# Patient Record
Sex: FEMALE | Race: WHITE | Employment: FULL TIME | URBAN - METROPOLITAN AREA
[De-identification: names, ages, dates, MRNs, and addresses within clinical notes are randomized per-mention and may not be internally consistent; named-entity substitution may affect disease eponyms.]

---

## 2017-03-07 NOTE — PROGRESS NOTES
[facility] Internal Medicine Office Note  Chief Complaint:   Patient presents with:  Establish Care: last pap 1/2017- lily pt declined flu shot  Anxiety      HPI:   This is a 42year old female new patient coming in for anxiety  She says it ha Physical Exam   Vitals reviewed. Constitutional: She appears well-developed. No distress. HENT:   Head: Normocephalic and atraumatic. Right Ear: Tympanic membrane is not erythematous. Left Ear: Tympanic membrane is not erythematous.    Mouth/Throa Patient/Caregiver Education: There are no barriers to learning. Medical education done. Outcome: Patient verbalizes understanding. Patient is notified to call with any questions, complications, allergies, or worsening or changing symptoms.   Patient is to c

## 2017-03-07 NOTE — PATIENT INSTRUCTIONS
Thank you for choosing Michelle Dunlap MD at [place] [address]  To Do: Julius Otoole  1. Start alprazolam as needed  2. Follow up with Gayle Horvath   3.  Follow up in 6 weeks     • Please signup for MY CHART, which is electronic access to your record if call Central Scheduling at [phone]  Please allow our office 5 business days to contact you regarding any testing results.     Refill policies:   Allow 3 business days for refills; controlled substances may take longer and must be picked up from the off

## 2017-09-27 NOTE — TELEPHONE ENCOUNTER
Requesting Alprazolam 0.25 mg   LOV: 3/7/17  Anxiety - discussed starting alprazolam again (do not take with alcohol or drive with it) and if she feels like she is needing it more than a few times per week to consider an everyday medication such as lexapro

## 2017-10-02 RX ORDER — ALPRAZOLAM 0.25 MG/1
TABLET ORAL
Qty: 30 TABLET | Refills: 0
Start: 2017-10-02

## 2017-11-07 ENCOUNTER — LAB ENCOUNTER (OUTPATIENT)
Dept: LAB | Age: 37
End: 2017-11-07
Attending: INTERNAL MEDICINE
Payer: COMMERCIAL

## 2017-11-07 ENCOUNTER — OFFICE VISIT (OUTPATIENT)
Dept: FAMILY MEDICINE CLINIC | Facility: CLINIC | Age: 37
End: 2017-11-07

## 2017-11-07 VITALS
RESPIRATION RATE: 14 BRPM | BODY MASS INDEX: 23.39 KG/M2 | SYSTOLIC BLOOD PRESSURE: 110 MMHG | DIASTOLIC BLOOD PRESSURE: 70 MMHG | HEART RATE: 72 BPM | WEIGHT: 149 LBS | TEMPERATURE: 97 F | HEIGHT: 67 IN

## 2017-11-07 DIAGNOSIS — E55.9 HYPOVITAMINOSIS D: ICD-10-CM

## 2017-11-07 DIAGNOSIS — Z00.00 LABORATORY EXAM ORDERED AS PART OF ROUTINE GENERAL MEDICAL EXAMINATION: ICD-10-CM

## 2017-11-07 DIAGNOSIS — F41.9 ANXIETY: ICD-10-CM

## 2017-11-07 DIAGNOSIS — Z00.00 WELLNESS EXAMINATION: Primary | ICD-10-CM

## 2017-11-07 DIAGNOSIS — Z80.0 FAMILY HISTORY OF COLON CANCER: ICD-10-CM

## 2017-11-07 PROCEDURE — 82306 VITAMIN D 25 HYDROXY: CPT | Performed by: INTERNAL MEDICINE

## 2017-11-07 PROCEDURE — 99395 PREV VISIT EST AGE 18-39: CPT | Performed by: INTERNAL MEDICINE

## 2017-11-07 PROCEDURE — 80050 GENERAL HEALTH PANEL: CPT | Performed by: INTERNAL MEDICINE

## 2017-11-07 PROCEDURE — 90715 TDAP VACCINE 7 YRS/> IM: CPT | Performed by: INTERNAL MEDICINE

## 2017-11-07 PROCEDURE — 90471 IMMUNIZATION ADMIN: CPT | Performed by: INTERNAL MEDICINE

## 2017-11-07 PROCEDURE — 36415 COLL VENOUS BLD VENIPUNCTURE: CPT | Performed by: INTERNAL MEDICINE

## 2017-11-07 PROCEDURE — 80061 LIPID PANEL: CPT | Performed by: INTERNAL MEDICINE

## 2017-11-07 RX ORDER — ALPRAZOLAM 0.25 MG/1
0.25 TABLET ORAL 2 TIMES DAILY PRN
Qty: 30 TABLET | Refills: 4 | Status: SHIPPED | OUTPATIENT
Start: 2017-11-07 | End: 2018-10-30

## 2017-11-07 NOTE — PROGRESS NOTES
[facility] Internal Medicine Office Note  Chief Complaint:   Patient presents with: Anxiety: states she's doing good with med. Medication Follow-Up: refill Alprazolam  Imm/Inj: declines the flu vaccine today.       HPI:   This is a 42year old f kg/m² as calculated from the following:    Height as of this encounter: 67\". Weight as of this encounter: 149 lb. Vital signs reviewed. Appears stated age, well groomed. Physical Exam   Vitals reviewed. Constitutional: She appears well-developed. REFLEX TO FREE T4; Future    Other orders  -     FLULAVAL INFLUENZA VACCINE QUAD PRESERVATIVE FREE 0.5 ML  -     TETANUS, DIPHTHERIA TOXOIDS AND ACELLULAR PERTUSIS VACCINE (TDAP), >7 YEARS, IM USE    Orders Placed This Encounter      CBC W Differential W P

## 2017-11-07 NOTE — PROGRESS NOTES
Received via fax Pap & HPV results dated 12/06/2016 from Dr. Marcus Chi @ [address] [place] [address]. Health maintenance updated & report placed in Dr. Silvestre Anders in box for review.

## 2017-11-07 NOTE — PATIENT INSTRUCTIONS
Thank you for choosing Aleksey Page MD at [place] [address]  To Do: Tarry Severs  1. Blood work today  2. Consider screening colonoscopy at age 34 because of family history     For your records:  Tdap tetanus shot today 11/7/17  Effective 6/19/17 until discussed today.  All therapies have potential risk of harm or side effects or medication interactions.  It is your duty and for your safety to discuss with the pharmacist and our office with questions, and to notify us and stop treatment if problems arise

## 2018-01-20 ENCOUNTER — APPOINTMENT (OUTPATIENT)
Dept: CT IMAGING | Age: 38
End: 2018-01-20
Attending: EMERGENCY MEDICINE
Payer: COMMERCIAL

## 2018-01-20 ENCOUNTER — HOSPITAL ENCOUNTER (EMERGENCY)
Age: 38
Discharge: HOME | End: 2018-01-20
Attending: EMERGENCY MEDICINE
Payer: COMMERCIAL

## 2018-01-20 VITALS
HEART RATE: 86 BPM | OXYGEN SATURATION: 98 % | WEIGHT: 140 LBS | BODY MASS INDEX: 21.22 KG/M2 | RESPIRATION RATE: 20 BRPM | TEMPERATURE: 98 F | DIASTOLIC BLOOD PRESSURE: 77 MMHG | SYSTOLIC BLOOD PRESSURE: 132 MMHG | HEIGHT: 68 IN

## 2018-01-20 DIAGNOSIS — N20.0 KIDNEY STONE: Primary | ICD-10-CM

## 2018-01-20 DIAGNOSIS — N39.0 URINARY TRACT INFECTION WITHOUT HEMATURIA, SITE UNSPECIFIED: ICD-10-CM

## 2018-01-20 LAB
COLOR UR AUTO: YELLOW
GLUCOSE UR STRIP.AUTO-MCNC: NEGATIVE MG/DL
NITRITE UR QL STRIP.AUTO: NEGATIVE
PH UR STRIP.AUTO: 5.5 [PH] (ref 4.5–8)
POCT LOT NUMBER: NORMAL
POCT URINE PREGNANCY: NEGATIVE
SP GR UR STRIP.AUTO: >=1.03 (ref 1–1.03)
UROBILINOGEN UR STRIP.AUTO-MCNC: 0.2 MG/DL

## 2018-01-20 PROCEDURE — 99283 EMERGENCY DEPT VISIT LOW MDM: CPT

## 2018-01-20 PROCEDURE — 87086 URINE CULTURE/COLONY COUNT: CPT | Performed by: EMERGENCY MEDICINE

## 2018-01-20 PROCEDURE — 81001 URINALYSIS AUTO W/SCOPE: CPT | Performed by: EMERGENCY MEDICINE

## 2018-01-20 PROCEDURE — 81025 URINE PREGNANCY TEST: CPT

## 2018-01-20 PROCEDURE — 87086 URINE CULTURE/COLONY COUNT: CPT

## 2018-01-20 PROCEDURE — 81001 URINALYSIS AUTO W/SCOPE: CPT

## 2018-01-20 RX ORDER — CIPROFLOXACIN 500 MG/1
500 TABLET, FILM COATED ORAL 2 TIMES DAILY
Qty: 6 TABLET | Refills: 0 | Status: SHIPPED | OUTPATIENT
Start: 2018-01-20 | End: 2018-01-23

## 2018-01-20 NOTE — ED PROVIDER NOTES
Patient Seen in: [facility] [place]    History   Patient presents with:  Abdomen/Flank Pain (GI/)    Stated Complaint: right flank pain started this morning, hx of kidney stones, denies nausea, vomi*    HPI    13-year-old female Sclerae anicteric. Conjunctivae show no pallor. Oropharynx clear, mucous membranes moist   Neck: supple, no rigidity   Lungs: good air exchange and clear   Heart: regular rate rhythm and no murmur   Abdomen: Soft and nontender. No abdominal masses.   No within reasonable clinical confidence and prior to discharge, that an emergency medical condition was not or was no longer present. There was no indication for further evaluation, treatment or admission on an emergency basis.   Comprehensive verbal and wri

## 2018-01-20 NOTE — ED INITIAL ASSESSMENT (HPI)
Pt c/o right flank pain since this morning. Denies nausea, vomiting, or fever. States hx of kidney stones.

## 2018-07-15 DIAGNOSIS — F41.9 ANXIETY: ICD-10-CM

## 2018-07-16 NOTE — TELEPHONE ENCOUNTER
Refill requested: Alprazolam .25     Failed protocol      Last refill: 11/7/17 #30 4R    Relevant Labs: NA    Last OV / RTC advised: 11/7/17 Dr Sheffield Ormond RTC?   Appt Scheduled:

## 2018-07-17 RX ORDER — ALPRAZOLAM 0.25 MG/1
0.25 TABLET ORAL 2 TIMES DAILY PRN
Qty: 30 TABLET | Refills: 0 | OUTPATIENT
Start: 2018-07-17

## 2018-10-30 ENCOUNTER — OFFICE VISIT (OUTPATIENT)
Dept: INTERNAL MEDICINE CLINIC | Facility: CLINIC | Age: 38
End: 2018-10-30
Payer: COMMERCIAL

## 2018-10-30 ENCOUNTER — LAB ENCOUNTER (OUTPATIENT)
Dept: LAB | Age: 38
End: 2018-10-30
Attending: INTERNAL MEDICINE
Payer: COMMERCIAL

## 2018-10-30 VITALS
TEMPERATURE: 99 F | RESPIRATION RATE: 14 BRPM | DIASTOLIC BLOOD PRESSURE: 72 MMHG | BODY MASS INDEX: 23.7 KG/M2 | SYSTOLIC BLOOD PRESSURE: 118 MMHG | HEART RATE: 78 BPM | HEIGHT: 67 IN | WEIGHT: 151 LBS

## 2018-10-30 DIAGNOSIS — E55.9 HYPOVITAMINOSIS D: ICD-10-CM

## 2018-10-30 DIAGNOSIS — Z00.00 LABORATORY EXAM ORDERED AS PART OF ROUTINE GENERAL MEDICAL EXAMINATION: ICD-10-CM

## 2018-10-30 DIAGNOSIS — Z00.00 WELLNESS EXAMINATION: Primary | ICD-10-CM

## 2018-10-30 DIAGNOSIS — N92.6 MISSED PERIOD: ICD-10-CM

## 2018-10-30 DIAGNOSIS — F41.9 ANXIETY: ICD-10-CM

## 2018-10-30 PROCEDURE — 81025 URINE PREGNANCY TEST: CPT | Performed by: INTERNAL MEDICINE

## 2018-10-30 PROCEDURE — 80050 GENERAL HEALTH PANEL: CPT | Performed by: INTERNAL MEDICINE

## 2018-10-30 PROCEDURE — 36415 COLL VENOUS BLD VENIPUNCTURE: CPT | Performed by: INTERNAL MEDICINE

## 2018-10-30 PROCEDURE — 82306 VITAMIN D 25 HYDROXY: CPT | Performed by: INTERNAL MEDICINE

## 2018-10-30 PROCEDURE — 99396 PREV VISIT EST AGE 40-64: CPT | Performed by: INTERNAL MEDICINE

## 2018-10-30 RX ORDER — ALPRAZOLAM 0.25 MG/1
0.25 TABLET ORAL 2 TIMES DAILY PRN
Qty: 30 TABLET | Refills: 4 | Status: SHIPPED | OUTPATIENT
Start: 2018-10-30 | End: 2019-07-30

## 2018-10-30 NOTE — PROGRESS NOTES
[facility] Internal Medicine Office Note  Chief Complaint:   Patient presents with: Follow - Up: f/u and refill on meds. Asked about pregn. test      HPI:   This is a 34year old female coming in for f/u for anxiety  HPI  Anxiety doing ok.  Some 98.6 °F (37 °C) (Oral)   Resp 14   Ht 67\"   Wt 151 lb   LMP 09/17/2018 (Exact Date)   Breastfeeding? No   BMI 23.65 kg/m²  Estimated body mass index is 23.65 kg/m² as calculated from the following:    Height as of this encounter: 67\".     Weight as of thi medical examination  -     CBC WITH DIFFERENTIAL WITH PLATELET; Future  -     COMP METABOLIC PANEL (14); Future  -     TSH W REFLEX TO FREE T4; Future    Other orders  -     Cancel: [facility] 2D+3D SCREENING BILAT (CPT=77067/39960);  Future        Orders [place]

## 2018-11-01 RX ORDER — ERGOCALCIFEROL 1.25 MG/1
50000 CAPSULE ORAL WEEKLY
Qty: 12 CAPSULE | Refills: 0 | Status: SHIPPED | OUTPATIENT
Start: 2018-11-01 | End: 2019-01-30

## 2019-03-28 ENCOUNTER — TELEPHONE (OUTPATIENT)
Dept: INTERNAL MEDICINE CLINIC | Facility: CLINIC | Age: 39
End: 2019-03-28

## 2019-03-28 NOTE — TELEPHONE ENCOUNTER
Results received from [address] [place] [address] regarding patient's mammogram .    Per report, additional views needed.     LVM for patient to make sure appt scheduled for additional views

## 2019-07-02 DIAGNOSIS — F41.9 ANXIETY: ICD-10-CM

## 2019-07-02 RX ORDER — ALPRAZOLAM 0.25 MG/1
TABLET ORAL
Qty: 30 TABLET | Refills: 0 | OUTPATIENT
Start: 2019-07-02

## 2019-07-02 NOTE — TELEPHONE ENCOUNTER
Alprazolam 0.25 MG Oral tab  Take 1 tablet (0.25mg total) by mouth  2 times daily as needed for anxiety      Last OV relevant to medication: 10/30/2018    Last refill date: 10/30/2018     #/refills: #30 w/ 4 refills    When pt was asked to return for OV: N

## 2019-07-30 NOTE — PROGRESS NOTES
[facility] Internal Medicine Office Note  Chief Complaint:   Patient presents with:  Medication Follow-Up      HPI:   This is a 37year old female coming in for follow up   HPI  She takes 1-2 alprazolam doses per week   Anxiety has been doing ok calculated from the following:    Height as of this encounter: 67\". Weight as of this encounter: 151 lb. Vital signs reviewed. Appears stated age, well groomed. Physical Exam    Constitutional: She appears well-developed. No distress.    HENT:   Head Patient is to call with any side effects or complications from the treatments as a result of today.      Apple Issa MD

## 2020-02-08 DIAGNOSIS — F41.9 ANXIETY: ICD-10-CM

## 2020-02-08 NOTE — TELEPHONE ENCOUNTER
Sent [employer] message - asking pt to schedule appt for refill. Failed protocol     Last refill:  7/30/2019 Alprazolam .25 mg #30 5R    LOV:   7/30/2019 Dr Mary Ann Villagran RTC ? Anxiety - continue alprazolam as needed. Symptoms are stable. -     ALPRAZolam 0.25 MG Oral Tab; Take 1 tablet (0.25 mg total) by mouth 2 (two) times daily as needed for Anxiety.       No FOV scheduled

## 2020-02-12 RX ORDER — ALPRAZOLAM 0.25 MG/1
TABLET ORAL
Qty: 30 TABLET | Refills: 0 | OUTPATIENT
Start: 2020-02-12

## 2020-03-06 NOTE — PROGRESS NOTES
Adela Jones is a 41year old female. HPI:   Patient presents for medication refill for alprazolam.     Anxiety -- Patient reports her anxiety \"the same\" but worse lately because she's working a lot of overtime( 10 hours/week).  She reports taking alp social    Drug use: No      Family History   Problem Relation Age of Onset   • Other (Other) Father         diverticulitis/gout   • Breast Cancer Maternal Grandmother 61   • Colon Cancer Other 48        REVIEW OF SYSTEMS:   GENERAL: no recent illness, evgenyg issues and agrees to the plan.   The patient is asked to return in 6 months for annual physical.

## 2020-03-06 NOTE — PROGRESS NOTES
[facility] Internal Medicine Office Note  Chief Complaint:   Patient presents with:  Medication Follow-Up      HPI:   This is a 41year old female coming in for  HPI    Anxiety has been worse recently; takes about twice a week, about 6 tabs/week Psychiatric/Behavioral: Negative.          EXAM:   /76 (BP Location: Left arm, Patient Position: Sitting, Cuff Size: adult)   Pulse 70   Temp 98.2 °F (36.8 °C) (Oral)   Ht 67\"   Wt 159 lb 4 oz (72.2 kg)   BMI 24.94 kg/m²  Estimated body mass index Platelet [E]      Comp Metabolic Panel (14) [E]      Lipid Panel [E]      TSH W Reflex To Free T4 [E]      Vitamin D, 25-Hydroxy [E]      Meds & Refills for this Visit:  Requested Prescriptions     Signed Prescriptions Disp Refills   • ALPRAZolam 0.25 MG O

## 2020-12-08 NOTE — PROGRESS NOTES
This is a telemedicine visit with live, interactive video and audio. Patient understands and accepts financial responsibility for any deductible, co-insurance and/or co-pays associated with this service.     SUBJECTIVE  She takes alprazolam for anxiety No acute distress. No increased work of breathing. Alert and oriented. ASSESSMENT & PLAN  Diagnoses and all orders for this visit:    Anxiety -cont alprazolam sparingly as needed.  If needing more than 4 to 5 doses/week, consider short term follow up

## 2021-06-15 DIAGNOSIS — F41.9 ANXIETY: ICD-10-CM

## 2021-06-17 RX ORDER — ALPRAZOLAM 0.25 MG/1
0.25 TABLET ORAL 2 TIMES DAILY PRN
Qty: 30 TABLET | Refills: 0 | Status: SHIPPED | OUTPATIENT
Start: 2021-06-17 | End: 2021-08-14

## 2021-08-14 ENCOUNTER — OFFICE VISIT (OUTPATIENT)
Dept: INTERNAL MEDICINE CLINIC | Facility: CLINIC | Age: 41
End: 2021-08-14
Payer: COMMERCIAL

## 2021-08-14 VITALS
TEMPERATURE: 98 F | RESPIRATION RATE: 16 BRPM | HEART RATE: 63 BPM | BODY MASS INDEX: 23.13 KG/M2 | OXYGEN SATURATION: 99 % | HEIGHT: 67 IN | DIASTOLIC BLOOD PRESSURE: 70 MMHG | SYSTOLIC BLOOD PRESSURE: 136 MMHG | WEIGHT: 147.38 LBS

## 2021-08-14 DIAGNOSIS — Z00.00 ENCOUNTER FOR ROUTINE ADULT MEDICAL EXAMINATION: Primary | ICD-10-CM

## 2021-08-14 DIAGNOSIS — Z00.00 LABORATORY EXAM ORDERED AS PART OF ROUTINE GENERAL MEDICAL EXAMINATION: ICD-10-CM

## 2021-08-14 DIAGNOSIS — E55.9 HYPOVITAMINOSIS D: ICD-10-CM

## 2021-08-14 DIAGNOSIS — F41.9 ANXIETY: ICD-10-CM

## 2021-08-14 PROCEDURE — 3078F DIAST BP <80 MM HG: CPT | Performed by: INTERNAL MEDICINE

## 2021-08-14 PROCEDURE — 3008F BODY MASS INDEX DOCD: CPT | Performed by: INTERNAL MEDICINE

## 2021-08-14 PROCEDURE — 3075F SYST BP GE 130 - 139MM HG: CPT | Performed by: INTERNAL MEDICINE

## 2021-08-14 PROCEDURE — 99396 PREV VISIT EST AGE 40-64: CPT | Performed by: INTERNAL MEDICINE

## 2021-08-14 RX ORDER — ALPRAZOLAM 0.25 MG/1
0.25 TABLET ORAL 2 TIMES DAILY PRN
Qty: 30 TABLET | Refills: 5 | Status: SHIPPED | OUTPATIENT
Start: 2021-08-14

## 2021-08-14 NOTE — PROGRESS NOTES
[facility] Internal Medicine Office Note  Chief Complaint:   Patient presents with:  Medication Follow-Up      HPI:   This is a 35year old female coming in for physical   HPI    Anxiety - she is exercising 4-5 days/week  She takes alprazolam abo EXAM:   /70   Pulse 63   Temp 98 °F (36.7 °C) (Oral)   Resp 16   Ht 5' 7\" (1.702 m)   Wt 147 lb 6.4 oz (66.9 kg)   LMP 07/31/2021   SpO2 99%   BMI 23.09 kg/m²  Estimated body mass index is 23.09 kg/m² as calculated from the following:    Emma recheck   -     VITAMIN D, 25-HYDROXY; Future    Laboratory exam ordered as part of routine general medical examination  -     CBC WITH DIFFERENTIAL WITH PLATELET; Future  -     COMP METABOLIC PANEL (14); Future  -     LIPID PANEL;  Future  -     TSH W REFL

## 2021-08-25 ENCOUNTER — PATIENT MESSAGE (OUTPATIENT)
Dept: INTERNAL MEDICINE CLINIC | Facility: CLINIC | Age: 41
End: 2021-08-25

## 2021-08-25 NOTE — TELEPHONE ENCOUNTER
From: Vince Corey MD  To: Fritz Fuentes  Sent: 8/25/2021 8:43 AM CDT  Subject: mammogram results    Hi Bozena,     I received a copy of your mammogram results ordered by Dr. Sandie Barrientos.  A diagnostic mammogram is recommended for the left breast and I just wante

## 2021-09-18 ENCOUNTER — LABORATORY ENCOUNTER (OUTPATIENT)
Dept: LAB | Age: 41
End: 2021-09-18
Attending: INTERNAL MEDICINE
Payer: COMMERCIAL

## 2021-09-18 DIAGNOSIS — E55.9 HYPOVITAMINOSIS D: ICD-10-CM

## 2021-09-18 DIAGNOSIS — Z00.00 LABORATORY EXAM ORDERED AS PART OF ROUTINE GENERAL MEDICAL EXAMINATION: ICD-10-CM

## 2021-09-18 LAB
ALBUMIN SERPL-MCNC: 3.4 G/DL (ref 3.4–5)
ALBUMIN/GLOB SERPL: 1 {RATIO} (ref 1–2)
ALP LIVER SERPL-CCNC: 54 U/L
ALT SERPL-CCNC: 33 U/L
ANION GAP SERPL CALC-SCNC: 5 MMOL/L (ref 0–18)
AST SERPL-CCNC: 17 U/L (ref 15–37)
BASOPHILS # BLD AUTO: 0.02 X10(3) UL (ref 0–0.2)
BASOPHILS NFR BLD AUTO: 0.4 %
BILIRUB SERPL-MCNC: 0.3 MG/DL (ref 0.1–2)
BUN BLD-MCNC: 11 MG/DL (ref 7–18)
CALCIUM BLD-MCNC: 9.6 MG/DL (ref 8.5–10.1)
CHLORIDE SERPL-SCNC: 112 MMOL/L (ref 98–112)
CHOLEST SERPL-MCNC: 193 MG/DL (ref ?–200)
CO2 SERPL-SCNC: 22 MMOL/L (ref 21–32)
CREAT BLD-MCNC: 0.65 MG/DL
EOSINOPHIL # BLD AUTO: 0.15 X10(3) UL (ref 0–0.7)
EOSINOPHIL NFR BLD AUTO: 3.1 %
ERYTHROCYTE [DISTWIDTH] IN BLOOD BY AUTOMATED COUNT: 12.7 %
GLOBULIN PLAS-MCNC: 3.5 G/DL (ref 2.8–4.4)
GLUCOSE BLD-MCNC: 87 MG/DL (ref 70–99)
HCT VFR BLD AUTO: 38.4 %
HDLC SERPL-MCNC: 61 MG/DL (ref 40–59)
HGB BLD-MCNC: 12.8 G/DL
IMM GRANULOCYTES # BLD AUTO: 0.01 X10(3) UL (ref 0–1)
IMM GRANULOCYTES NFR BLD: 0.2 %
LDLC SERPL CALC-MCNC: 113 MG/DL (ref ?–100)
LYMPHOCYTES # BLD AUTO: 1.61 X10(3) UL (ref 1–4)
LYMPHOCYTES NFR BLD AUTO: 33.5 %
MCH RBC QN AUTO: 32.2 PG (ref 26–34)
MCHC RBC AUTO-ENTMCNC: 33.3 G/DL (ref 31–37)
MCV RBC AUTO: 96.5 FL
MONOCYTES # BLD AUTO: 0.39 X10(3) UL (ref 0.1–1)
MONOCYTES NFR BLD AUTO: 8.1 %
NEUTROPHILS # BLD AUTO: 2.63 X10 (3) UL (ref 1.5–7.7)
NEUTROPHILS # BLD AUTO: 2.63 X10(3) UL (ref 1.5–7.7)
NEUTROPHILS NFR BLD AUTO: 54.7 %
NONHDLC SERPL-MCNC: 132 MG/DL (ref ?–130)
OSMOLALITY SERPL CALC.SUM OF ELEC: 287 MOSM/KG (ref 275–295)
PATIENT FASTING Y/N/NP: YES
PATIENT FASTING Y/N/NP: YES
PLATELET # BLD AUTO: 280 10(3)UL (ref 150–450)
POTASSIUM SERPL-SCNC: 4.1 MMOL/L (ref 3.5–5.1)
PROT SERPL-MCNC: 6.9 G/DL (ref 6.4–8.2)
RBC # BLD AUTO: 3.98 X10(6)UL
SODIUM SERPL-SCNC: 139 MMOL/L (ref 136–145)
TRIGL SERPL-MCNC: 106 MG/DL (ref 30–149)
TSI SER-ACNC: 3.26 MIU/ML (ref 0.36–3.74)
VIT D+METAB SERPL-MCNC: 36.2 NG/ML (ref 30–100)
VLDLC SERPL CALC-MCNC: 18 MG/DL (ref 0–30)
WBC # BLD AUTO: 4.8 X10(3) UL (ref 4–11)

## 2021-09-18 PROCEDURE — 80050 GENERAL HEALTH PANEL: CPT | Performed by: INTERNAL MEDICINE

## 2021-09-18 PROCEDURE — 80061 LIPID PANEL: CPT | Performed by: INTERNAL MEDICINE

## 2021-09-18 PROCEDURE — 82306 VITAMIN D 25 HYDROXY: CPT | Performed by: INTERNAL MEDICINE

## 2021-10-15 ENCOUNTER — TELEPHONE (OUTPATIENT)
Dept: INTERNAL MEDICINE CLINIC | Facility: CLINIC | Age: 41
End: 2021-10-15

## 2021-10-15 NOTE — TELEPHONE ENCOUNTER
Pt c/o sharp stabbing pain/headache on the right side of her head since Monday. Patient also reports nausea. Headaches are constant and worsening when pressure is applied to back of neck at base of skull. Headaches have affected patients ability to sleep. Denies vomiting, vision changes, SOB, CP, arrhythmias. Unable to assess HR and BP at this time. Patient has taken ibuprofen which provides relief, however, patient has to take it around the clock to maintain relief. Next appointment not available until next Tuesday, appt offered to patient who declined. Patient stated that she would proceed to  for evaluation.

## 2021-10-15 NOTE — TELEPHONE ENCOUNTER
Patient has been experiencing a headache and nausea since Monday. She would like to speak with a nurse to discuss her symptoms. Please advise.

## 2021-11-09 ENCOUNTER — TELEPHONE (OUTPATIENT)
Dept: INTERNAL MEDICINE CLINIC | Facility: CLINIC | Age: 41
End: 2021-11-09

## 2021-11-09 NOTE — TELEPHONE ENCOUNTER
Pt contacted and c/o left sided abdominal cramping which stated on 11/4. Symptoms worse Saturday and Sunday. Rates pain today at a 3 on scale 1-10. BM normal with no blood. Reports abdominal area bloated and feels hard.  No c/o fever, chills, diarrhea, c

## 2021-11-09 NOTE — TELEPHONE ENCOUNTER
Pt complains of left sided ABD pain/cramping onset last Thursday 11/4  Worse over the weekend, Saturday & Sunday  Normal BM   Pain/cramping not as bad today but still there  abd feels hard to touch, bloating

## 2021-11-10 NOTE — TELEPHONE ENCOUNTER
Please reach out to patient to see if she went to urgent care/ER yesterday for eval or if she is still having symptoms, I can add her onto my schedule at 11am today    Agree with below.

## 2021-11-11 NOTE — TELEPHONE ENCOUNTER
Spoke with patient-- never proceed to UC. States that sx have resolved. Some mild bloating still present. Abd pain has resolved. Patient reports that she is passing flatus and having bowel movements. Denies abd pain, fevers, N/V/D, chills/sweats.

## 2021-11-17 ENCOUNTER — PATIENT MESSAGE (OUTPATIENT)
Dept: INTERNAL MEDICINE CLINIC | Facility: CLINIC | Age: 41
End: 2021-11-17

## 2021-11-17 ENCOUNTER — TELEPHONE (OUTPATIENT)
Dept: INTERNAL MEDICINE CLINIC | Facility: CLINIC | Age: 41
End: 2021-11-17

## 2021-11-17 DIAGNOSIS — E83.52 HYPERCALCEMIA: Primary | ICD-10-CM

## 2021-11-17 NOTE — TELEPHONE ENCOUNTER
From: Kaleigh Armstrong  To: Elfego Suarez MD  Sent: 11/17/2021 11:16 AM CST  Subject: Test results    Hello,    I have attached my test results from blood work done on 11/15/21.      Thank you

## 2021-11-17 NOTE — TELEPHONE ENCOUNTER
Pt contacted and stated she was seen at [facility] on 11/15 for abd pain. Pt states CT abdomen was ordered by IC and faxed to [facility]. She is waiting for them to obtain insurance approval and appointment. Pt continues with abdominal bloating and dull pain. Rates pain at 3 on scale 1-10. No c/o nausea, vomiting, fever, chills, diarrhea, constipation decreased appetite or bloody stools. Pt also stated her calcium level was elevated to 11. Follow-up appointment offered with Dr Mundo Rincon. Pt declined to schedule at this time and will attempt to send [employer] message with IC notes attached. Pt stated she is  unable to fax the notes and lives to far to drop off.

## 2021-11-17 NOTE — TELEPHONE ENCOUNTER
She will need repeat calcium level and parathyroid blood work drawn in 1 month (order placed). I recommend scheduling follow up appointment after CT is performed to discuss results and next steps.

## 2021-11-17 NOTE — TELEPHONE ENCOUNTER
Pt stated she went to the  for abdominal pain this Monday 11/15/21, pt did labs at [facility] and got the results last night, pt was advised to call her pcp regarding her Calcium results, pt was told they are elevated up to an 11 pt is requesting to talk to a nurse to further discuss.

## 2021-11-20 ENCOUNTER — LABORATORY ENCOUNTER (OUTPATIENT)
Dept: LAB | Age: 41
End: 2021-11-20
Attending: INTERNAL MEDICINE
Payer: COMMERCIAL

## 2021-11-20 DIAGNOSIS — Z00.00 LABORATORY EXAM ORDERED AS PART OF ROUTINE GENERAL MEDICAL EXAMINATION: ICD-10-CM

## 2021-11-20 DIAGNOSIS — E83.52 HYPERCALCEMIA: ICD-10-CM

## 2021-11-20 DIAGNOSIS — E55.9 HYPOVITAMINOSIS D: ICD-10-CM

## 2021-11-20 PROCEDURE — 83970 ASSAY OF PARATHORMONE: CPT | Performed by: INTERNAL MEDICINE

## 2021-11-20 PROCEDURE — 80061 LIPID PANEL: CPT | Performed by: INTERNAL MEDICINE

## 2021-11-20 PROCEDURE — 80050 GENERAL HEALTH PANEL: CPT | Performed by: INTERNAL MEDICINE

## 2021-11-20 PROCEDURE — 82306 VITAMIN D 25 HYDROXY: CPT | Performed by: INTERNAL MEDICINE

## 2021-11-23 DIAGNOSIS — E83.52 HYPERCALCEMIA: Primary | ICD-10-CM

## 2021-12-03 ENCOUNTER — LAB ENCOUNTER (OUTPATIENT)
Dept: LAB | Age: 41
End: 2021-12-03
Attending: INTERNAL MEDICINE
Payer: COMMERCIAL

## 2021-12-03 DIAGNOSIS — E83.52 HYPERCALCEMIA: ICD-10-CM

## 2021-12-03 PROCEDURE — 82340 ASSAY OF CALCIUM IN URINE: CPT | Performed by: INTERNAL MEDICINE

## 2021-12-10 ENCOUNTER — TELEMEDICINE (OUTPATIENT)
Dept: INTERNAL MEDICINE CLINIC | Facility: CLINIC | Age: 41
End: 2021-12-10

## 2021-12-10 DIAGNOSIS — E83.52 HYPERCALCEMIA: Primary | ICD-10-CM

## 2021-12-10 DIAGNOSIS — F41.9 ANXIETY: ICD-10-CM

## 2021-12-10 PROCEDURE — 99213 OFFICE O/P EST LOW 20 MIN: CPT | Performed by: INTERNAL MEDICINE

## 2021-12-10 NOTE — PROGRESS NOTES
This is a telemedicine visit with live, interactive video and audio. Patient understands and accepts financial responsibility for any deductible, co-insurance and/or co-pays associated with this service.     SUBJECTIVE  Hypercalcemia:  She was having ab oriented. ASSESSMENT & PLAN  Diagnoses and all orders for this visit:    Hypercalcemia - reviewed results with patient. At urgent care: 11.0 (ref range 8.7-10.2) -> 10.7 (ref range 8.5-10.1) on repeat   24 hour urine calcium was 219 from 12/3/21.   iPTH

## 2022-01-24 NOTE — ED AVS SNAPSHOT
Fritz Gina   MRN: [id number]    Department:  THE [facility] Emergency Department in [place]   Date of Visit:  1/20/2018           Disclosure     Insurance plans vary and the physician(s) referred by the ER may not be covered by your plan.  Please contact y tell this physician (or your personal doctor if your instructions are to return to your personal doctor) about any new or lasting problems. The primary care or specialist physician will see patients referred from the [facility] Emergency Department.  Dwain Lind Initiate Treatment: sulfacetamide sodium-sulfur 10 %-5 % (w/w) topical cleanser: Apply to face BID\\ndoxycycline hyclate 100 mg capsule: Take 1 capsule PO BID with food for 1 month\\nmetronidazole 0.75 % topical cream: apply pea size amount to affected areas on chin twice a day Render In Strict Bullet Format?: No Detail Level: Zone

## 2022-02-09 RX ORDER — ALPRAZOLAM 0.25 MG/1
0.25 TABLET ORAL 2 TIMES DAILY PRN
Qty: 30 TABLET | Refills: 5 | OUTPATIENT
Start: 2022-02-09

## 2022-02-09 RX ORDER — ALPRAZOLAM 0.25 MG/1
TABLET ORAL
Qty: 30 TABLET | Refills: 0 | Status: SHIPPED | OUTPATIENT
Start: 2022-02-09 | End: 2022-03-16

## 2022-02-09 NOTE — TELEPHONE ENCOUNTER
No Protocol     Requesting: alprazolam 0.25mg     LOV:12/10/21   Anxiety:  She takes alprazolam intermittently, about 2 days a week for anxiety or insomnia  She notes palpitations occasionally.  She took alprazolam at time of palpitations and this helped  Anxiety - cont alprazolam sparingly as needed  RTC: none noted   Filled: 8/14/21 #30 5 refills   Recent Labs: 11/20/21     Upcoming OV: 5/4/22

## 2022-03-12 ENCOUNTER — LAB ENCOUNTER (OUTPATIENT)
Dept: LAB | Age: 42
End: 2022-03-12
Attending: SURGERY
Payer: COMMERCIAL

## 2022-03-12 DIAGNOSIS — E21.3 HYPERPARATHYROIDISM (HCC): ICD-10-CM

## 2022-03-12 DIAGNOSIS — Z01.818 PRE-OP TESTING: ICD-10-CM

## 2022-03-12 DIAGNOSIS — Z20.822 ENCOUNTER FOR PREOPERATIVE SCREENING LABORATORY TESTING FOR COVID-19 VIRUS: ICD-10-CM

## 2022-03-12 DIAGNOSIS — Z01.812 ENCOUNTER FOR PREOPERATIVE SCREENING LABORATORY TESTING FOR COVID-19 VIRUS: ICD-10-CM

## 2022-03-15 ENCOUNTER — ANESTHESIA EVENT (OUTPATIENT)
Dept: SURGERY | Facility: HOSPITAL | Age: 42
End: 2022-03-15
Payer: COMMERCIAL

## 2022-03-15 ENCOUNTER — HOSPITAL ENCOUNTER (OUTPATIENT)
Facility: HOSPITAL | Age: 42
Setting detail: HOSPITAL OUTPATIENT SURGERY
Discharge: HOME | End: 2022-03-15
Attending: SURGERY | Admitting: SURGERY
Payer: COMMERCIAL

## 2022-03-15 ENCOUNTER — ANESTHESIA (OUTPATIENT)
Dept: SURGERY | Facility: HOSPITAL | Age: 42
End: 2022-03-15
Payer: COMMERCIAL

## 2022-03-15 VITALS
BODY MASS INDEX: 24.27 KG/M2 | HEIGHT: 68 IN | DIASTOLIC BLOOD PRESSURE: 86 MMHG | OXYGEN SATURATION: 96 % | WEIGHT: 160.13 LBS | TEMPERATURE: 98 F | RESPIRATION RATE: 18 BRPM | SYSTOLIC BLOOD PRESSURE: 130 MMHG | HEART RATE: 86 BPM

## 2022-03-15 DIAGNOSIS — E21.3 HYPERPARATHYROIDISM (HCC): ICD-10-CM

## 2022-03-15 DIAGNOSIS — Z01.818 PRE-OP TESTING: Primary | ICD-10-CM

## 2022-03-15 DIAGNOSIS — Z20.822 ENCOUNTER FOR PREOPERATIVE SCREENING LABORATORY TESTING FOR COVID-19 VIRUS: ICD-10-CM

## 2022-03-15 DIAGNOSIS — Z01.812 ENCOUNTER FOR PREOPERATIVE SCREENING LABORATORY TESTING FOR COVID-19 VIRUS: ICD-10-CM

## 2022-03-15 LAB
B-HCG UR QL: NEGATIVE
PTH-INTACT SERPL-MCNC: 102.8 PG/ML
PTH-INTACT SERPL-MCNC: 1078.9 PG/ML
PTH-INTACT SERPL-MCNC: 136.4 PG/ML
PTH-INTACT SERPL-MCNC: 163.1 PG/ML
PTH-INTACT SERPL-MCNC: 38.8 PG/ML

## 2022-03-15 PROCEDURE — 0GBL0ZZ EXCISION OF RIGHT SUPERIOR PARATHYROID GLAND, OPEN APPROACH: ICD-10-PCS | Performed by: SURGERY

## 2022-03-15 PROCEDURE — 0GBP0ZZ EXCISION OF LEFT INFERIOR PARATHYROID GLAND, OPEN APPROACH: ICD-10-PCS | Performed by: SURGERY

## 2022-03-15 PROCEDURE — 88331 PATH CONSLTJ SURG 1 BLK 1SPC: CPT | Performed by: SURGERY

## 2022-03-15 PROCEDURE — 83970 ASSAY OF PARATHORMONE: CPT | Performed by: SURGERY

## 2022-03-15 PROCEDURE — 88305 TISSUE EXAM BY PATHOLOGIST: CPT | Performed by: SURGERY

## 2022-03-15 PROCEDURE — 81025 URINE PREGNANCY TEST: CPT

## 2022-03-15 RX ORDER — GLYCOPYRROLATE 0.2 MG/ML
INJECTION, SOLUTION INTRAMUSCULAR; INTRAVENOUS AS NEEDED
Status: DISCONTINUED | OUTPATIENT
Start: 2022-03-15 | End: 2022-03-15 | Stop reason: SURG

## 2022-03-15 RX ORDER — MEPERIDINE HYDROCHLORIDE 25 MG/ML
12.5 INJECTION INTRAMUSCULAR; INTRAVENOUS; SUBCUTANEOUS AS NEEDED
Status: DISCONTINUED | OUTPATIENT
Start: 2022-03-15 | End: 2022-03-15

## 2022-03-15 RX ORDER — NEOSTIGMINE METHYLSULFATE 1 MG/ML
INJECTION INTRAVENOUS AS NEEDED
Status: DISCONTINUED | OUTPATIENT
Start: 2022-03-15 | End: 2022-03-15 | Stop reason: SURG

## 2022-03-15 RX ORDER — ONDANSETRON 2 MG/ML
4 INJECTION INTRAMUSCULAR; INTRAVENOUS AS NEEDED
Status: DISCONTINUED | OUTPATIENT
Start: 2022-03-15 | End: 2022-03-15

## 2022-03-15 RX ORDER — IBUPROFEN 800 MG/1
800 TABLET ORAL EVERY 8 HOURS PRN
Qty: 20 TABLET | Refills: 1 | Status: SHIPPED | OUTPATIENT
Start: 2022-03-15 | End: 2022-04-04

## 2022-03-15 RX ORDER — BUPIVACAINE HYDROCHLORIDE 5 MG/ML
INJECTION, SOLUTION EPIDURAL; INTRACAUDAL AS NEEDED
Status: DISCONTINUED | OUTPATIENT
Start: 2022-03-15 | End: 2022-03-15 | Stop reason: HOSPADM

## 2022-03-15 RX ORDER — HYDROCODONE BITARTRATE AND ACETAMINOPHEN 5; 325 MG/1; MG/1
1 TABLET ORAL EVERY 4 HOURS PRN
Qty: 10 TABLET | Refills: 0 | Status: SHIPPED | OUTPATIENT
Start: 2022-03-15 | End: 2022-04-04

## 2022-03-15 RX ORDER — ACETAMINOPHEN 500 MG
1000 TABLET ORAL ONCE
COMMUNITY

## 2022-03-15 RX ORDER — METOCLOPRAMIDE HYDROCHLORIDE 5 MG/ML
10 INJECTION INTRAMUSCULAR; INTRAVENOUS AS NEEDED
Status: DISCONTINUED | OUTPATIENT
Start: 2022-03-15 | End: 2022-03-15

## 2022-03-15 RX ORDER — MIDAZOLAM HYDROCHLORIDE 1 MG/ML
1 INJECTION INTRAMUSCULAR; INTRAVENOUS EVERY 5 MIN PRN
Status: DISCONTINUED | OUTPATIENT
Start: 2022-03-15 | End: 2022-03-15

## 2022-03-15 RX ORDER — ROCURONIUM BROMIDE 10 MG/ML
INJECTION, SOLUTION INTRAVENOUS AS NEEDED
Status: DISCONTINUED | OUTPATIENT
Start: 2022-03-15 | End: 2022-03-15 | Stop reason: SURG

## 2022-03-15 RX ORDER — HYDROCODONE BITARTRATE AND ACETAMINOPHEN 5; 325 MG/1; MG/1
1 TABLET ORAL AS NEEDED
Status: COMPLETED | OUTPATIENT
Start: 2022-03-15 | End: 2022-03-15

## 2022-03-15 RX ORDER — DEXAMETHASONE SODIUM PHOSPHATE 4 MG/ML
VIAL (ML) INJECTION AS NEEDED
Status: DISCONTINUED | OUTPATIENT
Start: 2022-03-15 | End: 2022-03-15 | Stop reason: SURG

## 2022-03-15 RX ORDER — NALOXONE HYDROCHLORIDE 0.4 MG/ML
80 INJECTION, SOLUTION INTRAMUSCULAR; INTRAVENOUS; SUBCUTANEOUS AS NEEDED
Status: DISCONTINUED | OUTPATIENT
Start: 2022-03-15 | End: 2022-03-15

## 2022-03-15 RX ORDER — HYDROMORPHONE HYDROCHLORIDE 1 MG/ML
0.4 INJECTION, SOLUTION INTRAMUSCULAR; INTRAVENOUS; SUBCUTANEOUS EVERY 5 MIN PRN
Status: DISCONTINUED | OUTPATIENT
Start: 2022-03-15 | End: 2022-03-15

## 2022-03-15 RX ORDER — HYDROMORPHONE HYDROCHLORIDE 1 MG/ML
INJECTION, SOLUTION INTRAMUSCULAR; INTRAVENOUS; SUBCUTANEOUS
Status: COMPLETED
Start: 2022-03-15 | End: 2022-03-15

## 2022-03-15 RX ORDER — ONDANSETRON 2 MG/ML
INJECTION INTRAMUSCULAR; INTRAVENOUS AS NEEDED
Status: DISCONTINUED | OUTPATIENT
Start: 2022-03-15 | End: 2022-03-15 | Stop reason: SURG

## 2022-03-15 RX ORDER — DIPHENHYDRAMINE HYDROCHLORIDE 50 MG/ML
12.5 INJECTION INTRAMUSCULAR; INTRAVENOUS AS NEEDED
Status: DISCONTINUED | OUTPATIENT
Start: 2022-03-15 | End: 2022-03-15

## 2022-03-15 RX ORDER — SODIUM CHLORIDE, SODIUM LACTATE, POTASSIUM CHLORIDE, CALCIUM CHLORIDE 600; 310; 30; 20 MG/100ML; MG/100ML; MG/100ML; MG/100ML
INJECTION, SOLUTION INTRAVENOUS CONTINUOUS
Status: DISCONTINUED | OUTPATIENT
Start: 2022-03-15 | End: 2022-03-15

## 2022-03-15 RX ORDER — HYDROCODONE BITARTRATE AND ACETAMINOPHEN 5; 325 MG/1; MG/1
2 TABLET ORAL AS NEEDED
Status: COMPLETED | OUTPATIENT
Start: 2022-03-15 | End: 2022-03-15

## 2022-03-15 RX ORDER — DEXAMETHASONE SODIUM PHOSPHATE 4 MG/ML
4 VIAL (ML) INJECTION AS NEEDED
Status: DISCONTINUED | OUTPATIENT
Start: 2022-03-15 | End: 2022-03-15

## 2022-03-15 RX ORDER — METOCLOPRAMIDE HYDROCHLORIDE 5 MG/ML
INJECTION INTRAMUSCULAR; INTRAVENOUS AS NEEDED
Status: DISCONTINUED | OUTPATIENT
Start: 2022-03-15 | End: 2022-03-15 | Stop reason: SURG

## 2022-03-15 RX ADMIN — GLYCOPYRROLATE 0.2 MG: 0.2 INJECTION, SOLUTION INTRAMUSCULAR; INTRAVENOUS at 09:00:00

## 2022-03-15 RX ADMIN — ONDANSETRON 4 MG: 2 INJECTION INTRAMUSCULAR; INTRAVENOUS at 08:20:00

## 2022-03-15 RX ADMIN — GLYCOPYRROLATE 0.2 MG: 0.2 INJECTION, SOLUTION INTRAMUSCULAR; INTRAVENOUS at 08:38:00

## 2022-03-15 RX ADMIN — ROCURONIUM BROMIDE 15 MG: 10 INJECTION, SOLUTION INTRAVENOUS at 08:20:00

## 2022-03-15 RX ADMIN — NEOSTIGMINE METHYLSULFATE 1.5 MG: 1 INJECTION INTRAVENOUS at 08:38:00

## 2022-03-15 RX ADMIN — DEXAMETHASONE SODIUM PHOSPHATE 4 MG: 4 MG/ML VIAL (ML) INJECTION at 08:20:00

## 2022-03-15 RX ADMIN — METOCLOPRAMIDE HYDROCHLORIDE 10 MG: 5 INJECTION INTRAMUSCULAR; INTRAVENOUS at 08:20:00

## 2022-03-15 NOTE — BRIEF OP NOTE
Pre-Operative Diagnosis: Hyperparathyroidism ([facility] [address] [E21.3]     Post-Operative Diagnosis: Hyperparathyroidism ([facility] [address] [E21.3]      Procedure Performed:   PARATHYROIDECTOMY WITH INTRAOPERTIVE ULTRASOUND INTACT PARATHORMONE ASSAY, NERVE MONITORING    Surgeon(s) and Role:     Sharon Moses MD - Primary    Assistant(s):  Surgical Assistant.: KARI Mata     Surgical Findings: right superior - cellular     Left inferior - cellular; 76 % drop in intact PTH  Component      Latest Ref Rng & Units 3/15/2022 3/15/2022 3/15/2022 3/15/2022          10:12 AM  9:17 AM  9:04 AM  8:47 AM   PTH INTACT      pg/mL 38.8 136.4 163.1 1,078.9     Component      Latest Ref Rng & Units 3/15/2022           8:29 AM   PTH INTACT      pg/mL 102.8        Specimen: parathyroid gland     Estimated Blood Loss: Blood Output: 5 mL (3/15/2022 10:58 AM)    Dictation Number:  Rosa Leos MD  3/15/2022  11:07 AM

## 2022-03-15 NOTE — OPERATIVE REPORT
[facility]    PATIENT'S NAME: Mary Coe   ATTENDING PHYSICIAN: Annamarie Mathews M.D. OPERATING PHYSICIAN: Annamarie Mathews M.D. PATIENT ACCOUNT#:   [de-identified]    LOCATION:  [place] [address]  MEDICAL RECORD #:   [id number]       YOB: 1980  ADMISSION DATE:       03/15/2022      OPERATION DATE:  03/15/2022    OPERATIVE REPORT      PREOPERATIVE DIAGNOSIS:  Hyperparathyroidism. POSTOPERATIVE DIAGNOSIS:  Hyperparathyroidism. PROCEDURE:  Parathyroidectomy with intraoperative ultrasound, intact PTH assay, intraoperative frozen section, and bilateral neuromonitoring. ASSISTANT:  KARI Antoine     ANESTHESIA:  General.    ESTIMATED BLOOD LOSS:  5 mL. SPECIMEN:  Parathyroid gland. DISPOSITION:  To PACU. COMPLICATIONS:  None. INDICATIONS:  Patient is a 70-year-old female who presented to the office for evaluation of hyperparathyroidism. The patient stated her routine calcium level was elevated, and subsequently PTH was also checked and was elevated. She had on-and-off kidney stones for the last 15 years. She drinks plenty of water and has frequent urination. She does have some tiredness and fatigue, as well as some short-term memory and concentration issues, but she denied of any aches. Her highest calcium level was 11, with a 24-hour urine calcium that was normal; highest intact PTH level was 89; and normal vitamin D level of 37.5. Ultrasound was done and able to visualize the right posterior parathyroid gland measuring about 1.3 cm. No DEXA scan was done given her age was 38. So based on this findings, discussed with the patient undergoing a parathyroidectomy. The risks and benefits of surgery were discussed, and patient agreed for the procedure and signed the informed consent. FINDINGS:  Right superior and left inferior parathyroid glands cellular on frozen section. PTH levels 102.8, 1078.9, 163.1, 136.4, and 38.8, and 76% drop in the intact PTH level. OPERATIVE TECHNIQUE:  Procedure patient was brought to the operating room, was placed in supine position on the operating table. Lower extremity SCD boots were placed. After IV sedation and the neuromonitoring and endotracheal tube placement, both arms tucked at the side and roll was placed under shoulder blade, neck was slightly hyperextended. Ultrasound was done again and was able to visualize the left parathyroid gland. Then, after the area was prepped and draped into a sterile field, lower Kocher neck incision was made with a 15-blade knife. Electric Bovie cautery was used to separate the subcutaneous tissue, and the superior and inferior flaps raised. Deep cervical fascia was identified, midline incision was made, and then the left strap muscle was carefully dissected from the thyroid capsule. Then as the gland was medially rotated, at the inferior aspect of the thyroid gland the parathyroid gland was attached. It appears to be mildly enlarged. Then, the superior gland was also identified, which was normal.  Then, due to the smaller size than anticipated, the attention was turned to the right side to also look, and then the right inferior gland was normal but the right superior gland was larger than the left inferior, so that gland was removed. Three blood samples obtained pre-incision, pre-excision, and 10 minutes post excision of the gland. Even though the level dropped over 90%, pre-excision level was markedly high due to the stimulation of the gland. Then, 20 minutes post excision of the gland, the intact PTH level was obtained, and the blood level did not significantly drop in comparison. So, at this time, the left inferior gland was also dissected and removed, and it was sent down to Pathology. Then, the post excision of the inferior gland finally met the criteria. Both glands were cellular on frozen section. Then, the area was inspected. There was no evidence of any bleeding.   Good hemostasis noted. A piece of Surgicel laid, and a 3-0 Vicryl was used to close the deep cervical dermal layer. Local anesthetic was injected into the surrounding tissue, 4-0 Monocryl for the skin placing a subcuticular stitch. The incision was then covered with Steri-Strips, 4 x 4, and tape. The patient tolerated the procedure well, was extubated in the operating room, and transferred to PACU in stable condition. At the end of the case, the needle, instrument, and sponge counts were all correct. The surgical assistant was necessary for the procedure in positioning the patient, retracting the tissue, passing instruments, and exposing the wound to do the surgery, and at the end to close the skin and transfer the patient out of the operating room.      Dictated By Arsalan Larsen M.D.  d: 03/15/2022 13:10:08  t: 03/15/2022 18:23:44  Job [id number]  YZ/

## 2022-03-15 NOTE — ANESTHESIA PROCEDURE NOTES
Airway  Date/Time: 3/15/2022 8:28 AM  Urgency: elective      General Information and Staff    Patient location during procedure: OR  Anesthesiologist: Lauryn Ace MD  Performed: anesthesiologist     Indications and Patient Condition  Indications for airway management: anesthesia  Sedation level: deep  Preoxygenated: yes  Patient position: sniffing  Mask difficulty assessment: 1 - vent by mask    Final Airway Details  Final airway type: endotracheal airway      Successful airway: ETT  Cuffed: yes   Successful intubation technique: direct laryngoscopy  Endotracheal tube insertion site: oral    Cormack-Lehane Classification: grade IIB - view of arytenoids or posterior of glottis only  Placement verified by: chest auscultation and capnometry   Measured from: lips  ETT to lips (cm): 21  Number of attempts at approach: 1

## 2022-03-16 RX ORDER — ALPRAZOLAM 0.25 MG/1
TABLET ORAL
Qty: 30 TABLET | Refills: 0 | Status: SHIPPED | OUTPATIENT
Start: 2022-03-16

## 2022-03-16 NOTE — TELEPHONE ENCOUNTER
LOV: 12/10/2021 with Dr. Francisca Morley  RTC: no follow-up on file  Last Relevant Labs: 11/20/2021  Filled: 2/9/2022    #30 with 0 refills    Future Appointments   Date Time Provider Sergo [place]enegro   4/4/2022  8:45 AM Shashank Hung MD [facility]  [facility]   5/4/2022  9:45 AM Arabella Garcia MD [facility] [place]

## 2022-04-04 ENCOUNTER — LAB ENCOUNTER (OUTPATIENT)
Dept: LAB | Age: 42
End: 2022-04-04
Attending: SURGERY
Payer: COMMERCIAL

## 2022-04-04 DIAGNOSIS — Z09 EXAMINATION FOLLOWING SURGERY: ICD-10-CM

## 2022-04-04 LAB — CALCIUM BLD-MCNC: 11 MG/DL (ref 8.5–10.1)

## 2022-04-04 PROCEDURE — 36415 COLL VENOUS BLD VENIPUNCTURE: CPT

## 2022-04-04 PROCEDURE — 82310 ASSAY OF CALCIUM: CPT

## 2022-04-05 NOTE — PROGRESS NOTES
Lucie, have her repeat calcium and intact PTH in two week  Stop all calcium supplements if she is taking currently

## 2022-04-18 NOTE — TELEPHONE ENCOUNTER
LOV: 12/10/2021 with Dr. Guadalupe Hernandez  RTC: no follow-up on file  Last Relevant Labs: November/December 2021  Filled: 3/16/2022   #30 with 0 refills    Future Appointments   Date Time Provider Sergo Montenegro   4/19/2022  7:30 AM [facility]   5/4/2022  9:45 AM Kodak Euceda MD [facility] 8 [facility] [place]

## 2022-04-20 RX ORDER — ALPRAZOLAM 0.25 MG/1
0.25 TABLET ORAL 2 TIMES DAILY
Qty: 30 TABLET | Refills: 0 | Status: SHIPPED | OUTPATIENT
Start: 2022-04-20

## 2022-05-04 NOTE — PATIENT INSTRUCTIONS
Call today to schedule appointment with endocrinology    If you decide to start escitalopram (Lexapro is brand name), take 1/2 tablet once a day for the first 4 or 5 doses (cut in half with a pill cutter which you can buy at the pharmacy or use knife/cutting board). Then increase to full tablet. Consider follow up with our counselor, Lazaro Ribera.

## 2022-05-18 ENCOUNTER — OFFICE VISIT (OUTPATIENT)
Dept: ENDOCRINOLOGY CLINIC | Facility: CLINIC | Age: 42
End: 2022-05-18
Payer: COMMERCIAL

## 2022-05-18 VITALS
SYSTOLIC BLOOD PRESSURE: 152 MMHG | HEART RATE: 83 BPM | DIASTOLIC BLOOD PRESSURE: 98 MMHG | BODY MASS INDEX: 24 KG/M2 | WEIGHT: 157 LBS

## 2022-05-18 DIAGNOSIS — E21.3 HYPERPARATHYROIDISM (HCC): Primary | ICD-10-CM

## 2022-05-18 DIAGNOSIS — E83.52 HYPERCALCEMIA: ICD-10-CM

## 2022-05-18 PROCEDURE — 3080F DIAST BP >= 90 MM HG: CPT | Performed by: STUDENT IN AN ORGANIZED HEALTH CARE EDUCATION/TRAINING PROGRAM

## 2022-05-18 PROCEDURE — 3077F SYST BP >= 140 MM HG: CPT | Performed by: STUDENT IN AN ORGANIZED HEALTH CARE EDUCATION/TRAINING PROGRAM

## 2022-05-18 PROCEDURE — 99204 OFFICE O/P NEW MOD 45 MIN: CPT | Performed by: STUDENT IN AN ORGANIZED HEALTH CARE EDUCATION/TRAINING PROGRAM

## 2022-05-19 ENCOUNTER — LABORATORY ENCOUNTER (OUTPATIENT)
Dept: LAB | Age: 42
End: 2022-05-19
Attending: STUDENT IN AN ORGANIZED HEALTH CARE EDUCATION/TRAINING PROGRAM
Payer: COMMERCIAL

## 2022-05-19 DIAGNOSIS — E21.3 HYPERPARATHYROIDISM (HCC): Primary | ICD-10-CM

## 2022-05-19 DIAGNOSIS — E21.3 HYPERPARATHYROIDISM (HCC): ICD-10-CM

## 2022-05-19 LAB
CALCIUM BLD-MCNC: 10.5 MG/DL (ref 8.5–10.1)
PHOSPHATE SERPL-MCNC: 3 MG/DL (ref 2.5–4.9)
PTH-INTACT SERPL-MCNC: 91.4 PG/ML (ref 18.5–88)
VIT D+METAB SERPL-MCNC: 41.3 NG/ML (ref 30–100)

## 2022-05-19 PROCEDURE — 83970 ASSAY OF PARATHORMONE: CPT | Performed by: STUDENT IN AN ORGANIZED HEALTH CARE EDUCATION/TRAINING PROGRAM

## 2022-05-19 PROCEDURE — 84100 ASSAY OF PHOSPHORUS: CPT | Performed by: STUDENT IN AN ORGANIZED HEALTH CARE EDUCATION/TRAINING PROGRAM

## 2022-05-19 PROCEDURE — 82330 ASSAY OF CALCIUM: CPT | Performed by: STUDENT IN AN ORGANIZED HEALTH CARE EDUCATION/TRAINING PROGRAM

## 2022-05-19 PROCEDURE — 82310 ASSAY OF CALCIUM: CPT | Performed by: STUDENT IN AN ORGANIZED HEALTH CARE EDUCATION/TRAINING PROGRAM

## 2022-05-19 PROCEDURE — 82306 VITAMIN D 25 HYDROXY: CPT | Performed by: STUDENT IN AN ORGANIZED HEALTH CARE EDUCATION/TRAINING PROGRAM

## 2022-05-19 RX ORDER — CINACALCET 30 MG/1
30 TABLET, FILM COATED ORAL
Qty: 90 TABLET | Refills: 0 | Status: SHIPPED | OUTPATIENT
Start: 2022-05-19

## 2022-05-21 LAB
CALCIUM IONIZED PH 7.4: 1.47 MMOL/L
CALCIUM IONIZED, SERUM: 1.46 MMOL/L

## 2022-05-28 ENCOUNTER — HOSPITAL ENCOUNTER (OUTPATIENT)
Dept: BONE DENSITY | Age: 42
End: 2022-05-28
Attending: STUDENT IN AN ORGANIZED HEALTH CARE EDUCATION/TRAINING PROGRAM
Payer: COMMERCIAL

## 2022-05-28 ENCOUNTER — HOSPITAL ENCOUNTER (OUTPATIENT)
Dept: BONE DENSITY | Age: 42
Discharge: HOME | End: 2022-05-28
Attending: STUDENT IN AN ORGANIZED HEALTH CARE EDUCATION/TRAINING PROGRAM
Payer: COMMERCIAL

## 2022-05-28 ENCOUNTER — LAB ENCOUNTER (OUTPATIENT)
Dept: LAB | Age: 42
End: 2022-05-28
Attending: STUDENT IN AN ORGANIZED HEALTH CARE EDUCATION/TRAINING PROGRAM
Payer: COMMERCIAL

## 2022-05-28 DIAGNOSIS — E21.3 HYPERPARATHYROIDISM (HCC): ICD-10-CM

## 2022-05-28 LAB
CALCIUM 24H UR-SRATE: 412 MG/24HR (ref 42–353)
CREAT UR-SCNC: 1.26 G/24 HR (ref 0.6–1.8)
SPECIMEN VOL UR: 2425 ML
SPECIMEN VOL UR: 2425 ML

## 2022-05-28 PROCEDURE — 82570 ASSAY OF URINE CREATININE: CPT

## 2022-05-28 PROCEDURE — 77080 DXA BONE DENSITY AXIAL: CPT | Performed by: STUDENT IN AN ORGANIZED HEALTH CARE EDUCATION/TRAINING PROGRAM

## 2022-05-28 PROCEDURE — 82340 ASSAY OF CALCIUM IN URINE: CPT

## 2022-06-01 ENCOUNTER — TELEPHONE (OUTPATIENT)
Dept: ENDOCRINOLOGY CLINIC | Facility: CLINIC | Age: 42
End: 2022-06-01

## 2022-06-01 ENCOUNTER — PATIENT MESSAGE (OUTPATIENT)
Dept: INTERNAL MEDICINE CLINIC | Facility: CLINIC | Age: 42
End: 2022-06-01

## 2022-06-01 NOTE — TELEPHONE ENCOUNTER
From: Brook Calderon  To: Emilie Sanderson MD  Sent: 6/1/2022 10:41 AM CDT  Subject: Test results uploaded and Rx refill request     Hi,   I am having symptoms of kidney stones again. I was wondering if I can request a prescription refill for tamsulosin? My stomach is swollen and I have pain in my stomach and into side to lower back. It started the week of may 22. Also I had a 24hour urine test that was done through [facility] that was ordered through a different doctor. She told me to just let Dr Mundo Rincon know.    Thank you

## 2022-06-20 ENCOUNTER — TELEPHONE (OUTPATIENT)
Dept: ENDOCRINOLOGY CLINIC | Facility: CLINIC | Age: 42
End: 2022-06-20

## 2022-06-21 NOTE — TELEPHONE ENCOUNTER
Spoke with patient on telephone. She is following up with her surgeon Dr. Cristian Leos regarding Calcium. Per Dr. Cristian Leos order- patient will have repeat Calcium level drawn in August- will follow up on that result with Dr. Keli Ramos office. Confirmed with patient she is not taking Sensipar. Reviewed if any need- can contact our office, verbalized understanding.

## 2022-06-25 ENCOUNTER — HOSPITAL ENCOUNTER (EMERGENCY)
Age: 42
Discharge: HOME | End: 2022-06-25
Attending: EMERGENCY MEDICINE
Payer: COMMERCIAL

## 2022-06-25 ENCOUNTER — APPOINTMENT (OUTPATIENT)
Dept: GENERAL RADIOLOGY | Age: 42
End: 2022-06-25
Attending: EMERGENCY MEDICINE
Payer: COMMERCIAL

## 2022-06-25 VITALS
HEIGHT: 67 IN | OXYGEN SATURATION: 98 % | DIASTOLIC BLOOD PRESSURE: 100 MMHG | WEIGHT: 155 LBS | SYSTOLIC BLOOD PRESSURE: 144 MMHG | TEMPERATURE: 98 F | HEART RATE: 97 BPM | RESPIRATION RATE: 20 BRPM | BODY MASS INDEX: 24.33 KG/M2

## 2022-06-25 DIAGNOSIS — S62.102A CLOSED FRACTURE OF LEFT WRIST, INITIAL ENCOUNTER: Primary | ICD-10-CM

## 2022-06-25 PROCEDURE — 29125 APPL SHORT ARM SPLINT STATIC: CPT

## 2022-06-25 PROCEDURE — 73110 X-RAY EXAM OF WRIST: CPT | Performed by: EMERGENCY MEDICINE

## 2022-06-25 PROCEDURE — 99284 EMERGENCY DEPT VISIT MOD MDM: CPT

## 2022-06-25 RX ORDER — HYDROCODONE BITARTRATE AND ACETAMINOPHEN 5; 325 MG/1; MG/1
1-2 TABLET ORAL EVERY 6 HOURS PRN
Qty: 15 TABLET | Refills: 0 | Status: SHIPPED | OUTPATIENT
Start: 2022-06-25

## 2022-06-25 RX ORDER — IBUPROFEN 600 MG/1
600 TABLET ORAL ONCE
Status: COMPLETED | OUTPATIENT
Start: 2022-06-25 | End: 2022-06-25

## 2022-06-27 ENCOUNTER — TELEPHONE (OUTPATIENT)
Dept: ORTHOPEDICS CLINIC | Facility: CLINIC | Age: 42
End: 2022-06-27

## 2022-06-27 NOTE — TELEPHONE ENCOUNTER
Patient has a LT WRIST FX and is requesting an appointment. The first opening we have would be with Sincer for Thursday. Would anyone be able to see this patient sooner?

## 2022-06-28 ENCOUNTER — OFFICE VISIT (OUTPATIENT)
Dept: ORTHOPEDICS CLINIC | Facility: CLINIC | Age: 42
End: 2022-06-28
Payer: COMMERCIAL

## 2022-06-28 VITALS — WEIGHT: 155 LBS | BODY MASS INDEX: 24.33 KG/M2 | HEIGHT: 67 IN

## 2022-06-28 DIAGNOSIS — S52.552A OTHER CLOSED EXTRA-ARTICULAR FRACTURE OF DISTAL END OF LEFT RADIUS, INITIAL ENCOUNTER: Primary | ICD-10-CM

## 2022-06-28 PROCEDURE — 3008F BODY MASS INDEX DOCD: CPT | Performed by: PHYSICIAN ASSISTANT

## 2022-06-28 PROCEDURE — 99204 OFFICE O/P NEW MOD 45 MIN: CPT | Performed by: PHYSICIAN ASSISTANT

## 2022-07-05 NOTE — TELEPHONE ENCOUNTER
Please call patient to ask how she is doing with the escitalopram and confirm that she would like a refill

## 2022-07-06 RX ORDER — ESCITALOPRAM OXALATE 10 MG/1
TABLET ORAL
Qty: 90 TABLET | Refills: 1 | Status: SHIPPED | OUTPATIENT
Start: 2022-07-06

## 2022-07-06 NOTE — TELEPHONE ENCOUNTER
Called patient and she stated that she is doing well on this medication she will discuss more with you at her appointment on 08/01   Patient is leaving out of town [facility] and is in need of a refill

## 2022-07-14 ENCOUNTER — HOSPITAL ENCOUNTER (OUTPATIENT)
Dept: GENERAL RADIOLOGY | Age: 42
Discharge: HOME | End: 2022-07-14
Attending: PHYSICIAN ASSISTANT
Payer: COMMERCIAL

## 2022-07-14 ENCOUNTER — OFFICE VISIT (OUTPATIENT)
Dept: ORTHOPEDICS CLINIC | Facility: CLINIC | Age: 42
End: 2022-07-14
Payer: COMMERCIAL

## 2022-07-14 VITALS — BODY MASS INDEX: 24.33 KG/M2 | HEIGHT: 67 IN | WEIGHT: 155 LBS

## 2022-07-14 DIAGNOSIS — S52.552A OTHER CLOSED EXTRA-ARTICULAR FRACTURE OF DISTAL END OF LEFT RADIUS, INITIAL ENCOUNTER: ICD-10-CM

## 2022-07-14 DIAGNOSIS — S52.552A OTHER CLOSED EXTRA-ARTICULAR FRACTURE OF DISTAL END OF LEFT RADIUS, INITIAL ENCOUNTER: Primary | ICD-10-CM

## 2022-07-14 PROCEDURE — 3008F BODY MASS INDEX DOCD: CPT | Performed by: PHYSICIAN ASSISTANT

## 2022-07-14 PROCEDURE — 99024 POSTOP FOLLOW-UP VISIT: CPT | Performed by: PHYSICIAN ASSISTANT

## 2022-07-14 PROCEDURE — 73110 X-RAY EXAM OF WRIST: CPT | Performed by: PHYSICIAN ASSISTANT

## 2022-08-05 ENCOUNTER — TELEPHONE (OUTPATIENT)
Dept: ORTHOPEDICS CLINIC | Facility: CLINIC | Age: 42
End: 2022-08-05

## 2022-08-05 DIAGNOSIS — S66.812A EXTENSOR TENDON RUPTURE OF HAND, LEFT, INITIAL ENCOUNTER: Primary | ICD-10-CM

## 2022-08-05 NOTE — TELEPHONE ENCOUNTER
Spoke with patient and notified her regarding the STAT MRI orders (central scheduling line provided) and appt scheduled for follow up with Dr. Tad Pavon on Monday, 8/8/22. Pt verbalized understanding. No further questions at this time.

## 2022-08-05 NOTE — TELEPHONE ENCOUNTER
Patient called today and wanted an appointment today due to a issue with her thumb. Tranfered call to [facility].   Placed patient on the waiting list

## 2022-08-05 NOTE — TELEPHONE ENCOUNTER
Spoke with patient who stated that she has been mentioning to Chance AMADO at her appts she felt something is wrong with the thumb. Pt stated that yesterday, when she was changing her clothes, she felt a \"pop\" in the thumb and since then it has been stuck in a flexed position. Pt reports she is still wearing her brace. Pt states she can not move the thumb on its own, but can move it only when assisted with her other hand. Denies any numbness/tingling/pain/discoloration/swelling in the thumb, only a mild strained feeling. Pt asking for an appt today, but notified and aware [facility] and Dr. Devora Cintron are in the [address]. Pt asked if her appt on Thursday is okay to keep or if she needs to be seen earlier in the week. Please advise.

## 2022-08-08 ENCOUNTER — OFFICE VISIT (OUTPATIENT)
Dept: ORTHOPEDICS CLINIC | Facility: CLINIC | Age: 42
End: 2022-08-08
Payer: COMMERCIAL

## 2022-08-08 ENCOUNTER — TELEPHONE (OUTPATIENT)
Dept: ORTHOPEDICS CLINIC | Facility: CLINIC | Age: 42
End: 2022-08-08

## 2022-08-08 ENCOUNTER — HOSPITAL ENCOUNTER (OUTPATIENT)
Dept: MRI IMAGING | Facility: HOSPITAL | Age: 42
Discharge: HOME | End: 2022-08-08
Attending: PHYSICIAN ASSISTANT
Payer: COMMERCIAL

## 2022-08-08 VITALS — BODY MASS INDEX: 24.33 KG/M2 | HEIGHT: 67 IN | WEIGHT: 155 LBS

## 2022-08-08 DIAGNOSIS — S66.812A EXTENSOR TENDON RUPTURE OF HAND, LEFT, INITIAL ENCOUNTER: Primary | ICD-10-CM

## 2022-08-08 DIAGNOSIS — S66.812A EXTENSOR TENDON RUPTURE OF HAND, LEFT, INITIAL ENCOUNTER: ICD-10-CM

## 2022-08-08 PROCEDURE — 73221 MRI JOINT UPR EXTREM W/O DYE: CPT | Performed by: PHYSICIAN ASSISTANT

## 2022-08-08 RX ORDER — SODIUM CHLORIDE, SODIUM LACTATE, POTASSIUM CHLORIDE, CALCIUM CHLORIDE 600; 310; 30; 20 MG/100ML; MG/100ML; MG/100ML; MG/100ML
INJECTION, SOLUTION INTRAVENOUS CONTINUOUS
Status: CANCELLED | OUTPATIENT
Start: 2022-08-08

## 2022-08-08 NOTE — PROGRESS NOTES
OR BOOKING SHEET   Name: Magali Jimenez  MRN: [id number]   : 1980  Surgical Consent:  Repair of left EPL tendon    Diagnosis:  [x] Extensor tendon rupture of hand, left, initial encounter [W24.891J]  [x]   Laterality:   Left Thumb    Procedure Codes:  OTHER Repair of extensor tendon (14380)    Disposition:  Outpatient    Operative Time:  1 hr    Antibiotics:  Ancef    Anesthesia Type:  Monitored Anesthesia Care (MAC)    Clearance:   NONE    Equipment:  Oscar Blade, Local 1% lidocaine WITH epi, 0.5% marcaine - 1:1 mix, 3-0 Supramid Suture or OTHER: 6-0 Prolene    Positioning:  Supine with arm table on transport cart    Assistant request:   Assistant: Mychal Waddell PA-C    Anesthesiologist Request:   None    Follow Up    1 week postoperatively with Raquel Herrera    Other:     Do not shave or clip arm hair

## 2022-08-08 NOTE — TELEPHONE ENCOUNTER
Surgeon: Dr. Nick Soler    Date of Surgery: 8/10       Post Op Appt: 8/22 @ 1020AM     Prior Authorization:   Inpatient or Outpatient: Outpatient  Facility: [facility]  Work Comp: NO  CPT: 03941  DX: F29.704W     Surgical Assistant: Rosalee Case     Preadmission Testing: PER ANESTHESIA GUIDELINES     Pre-Op Clearance Requested: NONE    DME:  NONE NEEDED    Rehab Services Ordered: NONE     Disability Paperwork: NONE    On [place] Calendar: YES    Notes: REPAIR OF LT EPL TENDON. [place] Blade, Local 1% lidocaine WITH epi, 0.5% marcaine - 1:1 mix, 3-0 Supramid Suture 6-0 Prolene, Supine with arm table on transport cart.

## 2022-08-10 ENCOUNTER — ANESTHESIA (OUTPATIENT)
Dept: SURGERY | Facility: HOSPITAL | Age: 42
End: 2022-08-10
Payer: COMMERCIAL

## 2022-08-10 ENCOUNTER — HOSPITAL ENCOUNTER (OUTPATIENT)
Facility: HOSPITAL | Age: 42
Setting detail: HOSPITAL OUTPATIENT SURGERY
Discharge: HOME | End: 2022-08-10
Attending: ORTHOPAEDIC SURGERY | Admitting: ORTHOPAEDIC SURGERY
Payer: COMMERCIAL

## 2022-08-10 ENCOUNTER — ANESTHESIA EVENT (OUTPATIENT)
Dept: SURGERY | Facility: HOSPITAL | Age: 42
End: 2022-08-10
Payer: COMMERCIAL

## 2022-08-10 VITALS
BODY MASS INDEX: 24.99 KG/M2 | SYSTOLIC BLOOD PRESSURE: 156 MMHG | HEART RATE: 76 BPM | WEIGHT: 159.19 LBS | TEMPERATURE: 98 F | HEIGHT: 67 IN | DIASTOLIC BLOOD PRESSURE: 85 MMHG | OXYGEN SATURATION: 98 % | RESPIRATION RATE: 16 BRPM

## 2022-08-10 DIAGNOSIS — S66.812A EXTENSOR TENDON RUPTURE OF HAND, LEFT, INITIAL ENCOUNTER: ICD-10-CM

## 2022-08-10 LAB
B-HCG UR QL: NEGATIVE
SARS-COV-2 RNA RESP QL NAA+PROBE: NOT DETECTED

## 2022-08-10 PROCEDURE — 0LX80ZZ TRANSFER LEFT HAND TENDON, OPEN APPROACH: ICD-10-PCS | Performed by: ORTHOPAEDIC SURGERY

## 2022-08-10 PROCEDURE — 81025 URINE PREGNANCY TEST: CPT

## 2022-08-10 RX ORDER — HYDROMORPHONE HYDROCHLORIDE 1 MG/ML
0.2 INJECTION, SOLUTION INTRAMUSCULAR; INTRAVENOUS; SUBCUTANEOUS EVERY 5 MIN PRN
Status: DISCONTINUED | OUTPATIENT
Start: 2022-08-10 | End: 2022-08-10

## 2022-08-10 RX ORDER — KETOROLAC TROMETHAMINE 30 MG/ML
INJECTION, SOLUTION INTRAMUSCULAR; INTRAVENOUS AS NEEDED
Status: DISCONTINUED | OUTPATIENT
Start: 2022-08-10 | End: 2022-08-10 | Stop reason: SURG

## 2022-08-10 RX ORDER — HYDROCODONE BITARTRATE AND ACETAMINOPHEN 5; 325 MG/1; MG/1
1 TABLET ORAL ONCE AS NEEDED
Status: DISCONTINUED | OUTPATIENT
Start: 2022-08-10 | End: 2022-08-10

## 2022-08-10 RX ORDER — HYDROMORPHONE HYDROCHLORIDE 1 MG/ML
0.4 INJECTION, SOLUTION INTRAMUSCULAR; INTRAVENOUS; SUBCUTANEOUS EVERY 5 MIN PRN
Status: DISCONTINUED | OUTPATIENT
Start: 2022-08-10 | End: 2022-08-10

## 2022-08-10 RX ORDER — HYDROCODONE BITARTRATE AND ACETAMINOPHEN 5; 325 MG/1; MG/1
1 TABLET ORAL EVERY 6 HOURS PRN
Qty: 15 TABLET | Refills: 0 | Status: SHIPPED | OUTPATIENT
Start: 2022-08-10

## 2022-08-10 RX ORDER — PROCHLORPERAZINE EDISYLATE 5 MG/ML
5 INJECTION INTRAMUSCULAR; INTRAVENOUS EVERY 8 HOURS PRN
Status: DISCONTINUED | OUTPATIENT
Start: 2022-08-10 | End: 2022-08-10

## 2022-08-10 RX ORDER — CEFAZOLIN SODIUM/WATER 2 G/20 ML
2 SYRINGE (ML) INTRAVENOUS ONCE
Status: COMPLETED | OUTPATIENT
Start: 2022-08-10 | End: 2022-08-10

## 2022-08-10 RX ORDER — SCOLOPAMINE TRANSDERMAL SYSTEM 1 MG/1
1 PATCH, EXTENDED RELEASE TRANSDERMAL ONCE
Status: DISCONTINUED | OUTPATIENT
Start: 2022-08-10 | End: 2022-08-10 | Stop reason: HOSPADM

## 2022-08-10 RX ORDER — NALOXONE HYDROCHLORIDE 0.4 MG/ML
80 INJECTION, SOLUTION INTRAMUSCULAR; INTRAVENOUS; SUBCUTANEOUS AS NEEDED
Status: DISCONTINUED | OUTPATIENT
Start: 2022-08-10 | End: 2022-08-10

## 2022-08-10 RX ORDER — ACETAMINOPHEN 500 MG
1000 TABLET ORAL ONCE
Status: DISCONTINUED | OUTPATIENT
Start: 2022-08-10 | End: 2022-08-10 | Stop reason: HOSPADM

## 2022-08-10 RX ORDER — ACETAMINOPHEN 500 MG
1000 TABLET ORAL ONCE AS NEEDED
Status: DISCONTINUED | OUTPATIENT
Start: 2022-08-10 | End: 2022-08-10

## 2022-08-10 RX ORDER — HYDROCODONE BITARTRATE AND ACETAMINOPHEN 5; 325 MG/1; MG/1
2 TABLET ORAL ONCE AS NEEDED
Status: DISCONTINUED | OUTPATIENT
Start: 2022-08-10 | End: 2022-08-10

## 2022-08-10 RX ORDER — ONDANSETRON 2 MG/ML
4 INJECTION INTRAMUSCULAR; INTRAVENOUS EVERY 6 HOURS PRN
Status: DISCONTINUED | OUTPATIENT
Start: 2022-08-10 | End: 2022-08-10

## 2022-08-10 RX ORDER — HYDROMORPHONE HYDROCHLORIDE 1 MG/ML
0.6 INJECTION, SOLUTION INTRAMUSCULAR; INTRAVENOUS; SUBCUTANEOUS EVERY 5 MIN PRN
Status: DISCONTINUED | OUTPATIENT
Start: 2022-08-10 | End: 2022-08-10

## 2022-08-10 RX ORDER — SODIUM CHLORIDE, SODIUM LACTATE, POTASSIUM CHLORIDE, CALCIUM CHLORIDE 600; 310; 30; 20 MG/100ML; MG/100ML; MG/100ML; MG/100ML
INJECTION, SOLUTION INTRAVENOUS CONTINUOUS
Status: DISCONTINUED | OUTPATIENT
Start: 2022-08-10 | End: 2022-08-10

## 2022-08-10 RX ORDER — LIDOCAINE HYDROCHLORIDE AND EPINEPHRINE 10; 10 MG/ML; UG/ML
INJECTION, SOLUTION INFILTRATION; PERINEURAL AS NEEDED
Status: DISCONTINUED | OUTPATIENT
Start: 2022-08-10 | End: 2022-08-10 | Stop reason: HOSPADM

## 2022-08-10 RX ORDER — BUPIVACAINE HYDROCHLORIDE 5 MG/ML
INJECTION, SOLUTION EPIDURAL; INTRACAUDAL AS NEEDED
Status: DISCONTINUED | OUTPATIENT
Start: 2022-08-10 | End: 2022-08-10 | Stop reason: HOSPADM

## 2022-08-10 RX ORDER — ONDANSETRON 2 MG/ML
INJECTION INTRAMUSCULAR; INTRAVENOUS AS NEEDED
Status: DISCONTINUED | OUTPATIENT
Start: 2022-08-10 | End: 2022-08-10 | Stop reason: SURG

## 2022-08-10 RX ORDER — MIDAZOLAM HYDROCHLORIDE 1 MG/ML
INJECTION INTRAMUSCULAR; INTRAVENOUS AS NEEDED
Status: DISCONTINUED | OUTPATIENT
Start: 2022-08-10 | End: 2022-08-10 | Stop reason: SURG

## 2022-08-10 RX ORDER — CEFAZOLIN SODIUM/WATER 2 G/20 ML
SYRINGE (ML) INTRAVENOUS
Status: DISCONTINUED
Start: 2022-08-10 | End: 2022-08-10

## 2022-08-10 RX ADMIN — MIDAZOLAM HYDROCHLORIDE 2 MG: 1 INJECTION INTRAMUSCULAR; INTRAVENOUS at 10:44:00

## 2022-08-10 RX ADMIN — ONDANSETRON 4 MG: 2 INJECTION INTRAMUSCULAR; INTRAVENOUS at 10:48:00

## 2022-08-10 RX ADMIN — CEFAZOLIN SODIUM/WATER 2 G: 2 G/20 ML SYRINGE (ML) INTRAVENOUS at 10:36:00

## 2022-08-10 RX ADMIN — SODIUM CHLORIDE, SODIUM LACTATE, POTASSIUM CHLORIDE, CALCIUM CHLORIDE: 600; 310; 30; 20 INJECTION, SOLUTION INTRAVENOUS at 12:10:00

## 2022-08-10 RX ADMIN — SODIUM CHLORIDE, SODIUM LACTATE, POTASSIUM CHLORIDE, CALCIUM CHLORIDE: 600; 310; 30; 20 INJECTION, SOLUTION INTRAVENOUS at 10:47:00

## 2022-08-10 RX ADMIN — KETOROLAC TROMETHAMINE 30 MG: 30 INJECTION, SOLUTION INTRAMUSCULAR; INTRAVENOUS at 10:47:00

## 2022-08-10 NOTE — BRIEF OP NOTE
Pre-Operative Diagnosis: Extensor tendon rupture of hand, left, initial encounter [O31.960W]     Post-Operative Diagnosis: Extensor tendon rupture of hand, left, initial encounter [F75.708O]      Procedure Performed:   Left EIP to EPL transfer     Surgeon(s) and Role:     * Rashel Ritter MD - Primary    Assistant(s):  PA: MICHAEL Merchant     Surgical Findings: attritional rupture of EPL     Specimen: none     Estimated Blood Loss: Blood Output: 5 mL (8/10/2022 11:53 AM)      Dictation Number:  none    Taj Roque MD  8/10/2022  12:08 PM

## 2022-08-10 NOTE — ANESTHESIA POSTPROCEDURE EVALUATION
[address] Patient Status:  Hospital Outpatient Surgery   Age/Gender 38year old female MRN [id number]   [facility] SURGERY Attending Benton Robles MD   Hosp Day # 0 PCP Bailey Dupont MD       Anesthesia Post-op Note    REPAIR OF LEFT EXTENSOR POLLICIS LONGUS. Procedure Summary     Date: 08/10/22 Room / Location: [facility] OR 05 / 1404 [facility] OR    Anesthesia Start: 1031 Anesthesia Stop:     Procedure: REPAIR OF LEFT EXTENSOR POLLICIS LONGUS. (Left ) Diagnosis:       Extensor tendon rupture of hand, left, initial encounter      (Extensor tendon rupture of hand, left, initial encounter [Z39.130S])    Surgeons: Benton Robles MD Anesthesiologist: Keiko Romero MD    Anesthesia Type: MAC ASA Status: 1          Anesthesia Type: MAC    Vitals Value Taken Time   /89 08/10/22 1211   Temp 98 08/10/22 1211   Pulse 74 08/10/22 1211   Resp 18 08/10/22 1211   SpO2 98 08/10/22 1211       Patient Location: Same Day Surgery    Anesthesia Type: MAC    Airway Patency: patent    Postop Pain Control: adequate    Mental Status: mildly sedated but able to meaningfully participate in the post-anesthesia evaluation    Nausea/Vomiting: none    Cardiopulmonary/Hydration status: stable euvolemic    Complications: no apparent anesthesia related complications    Postop vital signs: stable    Dental Exam: Unchanged from Preop    Patient to be discharged home when criteria met.

## 2022-08-14 NOTE — OPERATIVE REPORT
Operative Note    Patient Name: Candy Delgado    Preoperative Diagnosis:     1. Left EPL tendon rupture [W70.202V]    Postoperative Diagnosis:     1. Left EPL tendon rupture [L75.474D]    Surgeon(s) and Role:     Luz Barry MD - Primary     Assistant: PA: MICHAEL Daivs     A PA was needed for the successful completion of this case. She was essential for the proper positioning of patient, manipulation of instruments, proper exposure, manipulation of soft tissue, and wound closure. Procedures:     1. Left EIP to EPL tendon transfer (CPT 93593)    Antibiotics: Ancef 2g     Surgical Findings: EPL attritional rupture with retraction    Anesthesia: MAC + Local    Complications: None    Implants: * No implants in log *    Specimen: None    Condition: Stable    Estimated Blood Loss: 5 mL    Indications:  38year old female who sustained an EPL rupture after a minimally/nondisplaced distal radius fracture. We discussed various nonsurgical and surgical treatment options as well as her expected outcomes. Patient wished to proceed with surgery in order to give her the best possible functional recovery. The risks associated with the procedure, expected  outcome, the expected time to recovery, and the rehab required were reviewed. All of the patient's questions were answered. Procedure:  Patient was met in the preoperative holding area where consent was verified, laterality was marked with the surgeon's initials, and the H&P was updated. Patient was brought to the operating room on a transport cart. Patient was transferred from the transport cart onto the operating room table and placed in a supine position. An upper arm tourniquet was placed. The arm was prepped and draped in the usual sterile fashion. A surgical timeout was performed. Antibiotics were fully infused. Local was used to infiltrate the incision sites.   Once adequate anesthesia was achieved, the limb was elevated and exsanguinated using Esmarch bandage. Tourniquet was raised to 250 mmHg. A 15 blade was made over the dorsal aspect of the wrist over Yoandy's tubercle. Incision was carried through the dermis through the subcutaneous tissue onto the extensor retinaculum. A longitudinal incision was made just ulnar to Yoandy's tubercle which allowed us to visualize the third dorsal compartment. The EPL tendon was identified to be ruptured with early pseudo tendon formation. The rupture was 1 cm from the musculotendinous junction of the EPL tendon. The muscle had retracted and primary repair was not feasible. The decision to proceed with an EIP transfer was made. A second longitudinal incision was made distally just proximal to the index finger metacarpal.  Incision was carried through dermis and Dash's was used to dissect through the subcutaneous tissue until the [facility] and EIP tendons to the index finger were identified. The EIP tendon was identified to be just ulnar and deep to the index finger EDC. The index EDC also had juncture a which also helped delineate between the 2 tendons. A 15 blade was used to incise the EIP tendon just proximal to the sagittal band. In the proximal incision ulnar aspect of the fourth dorsal compartment was opened to allow visualization of the tendons. The EIP tendon was identified due to it having a more distal muscle belly as well. The EIP tendon was pulled out small incision. A third longitudinal incision was made over the base of thumb metacarpal.  The incision was carried through this dermis. Adson's the incision was used to dissect through the subcutaneous tissue onto the distal stump of the EPL tendon which was identified. The EPL tendon was retracted out of the wound. A tendon passer was then used to create a subcutaneous tunnel to past the EIP tendon through. The EIP tendon was then shuttled through subcutaneously and out of the incision at the base of the thumb.   A small longitudinal split was made in the EPL tendon to allow the EIP tendon to be pulled through. The tendons were tensioned and a 3-0 Ethibond cruciate stitch was placed. The tenodesis was then assessed and compared to the contralateral side. The tensioning allowed the thumb IP joint to be extended and allowed the thumb to be opposed almost to the base of the small finger. At this point the patient was waken up and was instructed to extend the digits and oppose the thumb to the small finger. With testing appropriate tension was noted. A cytocide repair was then performed using 3-0 Ethibond. 5 cruciate stitches were placed distally and 1 additional cruciate stitch was placed proximally. Excess tendon was then excised. The tourniquet was let down and hemostasis was achieved. Closure:  4-0 Monocryl was used to reapproximate the skin edges. Subcuticular 4-0 Monocryl was run in each incision. Skin glue and Steri-Strips were applied. Dressing/Splint:  4 x 4 gauze and web roll was applied. Patient was placed in a splint to maintain 20 degrees of wrist extension as well as thumb spica component to keep the thumb extended. Post Operative:  Patient was woken up from anesthesia and taken to PACU for further recovery and discharge home. Austyn Aguilar MD  Hand, Wrist, & Elbow Surgery  [facility]  [facility] [address] [facility], [address] [place] [address]  [email or web address]  t: [id number]  f: [phone]

## 2022-08-16 ENCOUNTER — TELEPHONE (OUTPATIENT)
Dept: PHYSICAL THERAPY | Facility: HOSPITAL | Age: 42
End: 2022-08-16

## 2022-08-18 ENCOUNTER — OFFICE VISIT (OUTPATIENT)
Dept: OCCUPATIONAL MEDICINE | Age: 42
End: 2022-08-18
Attending: PHYSICIAN ASSISTANT
Payer: COMMERCIAL

## 2022-08-18 DIAGNOSIS — S66.812A EXTENSOR TENDON RUPTURE OF HAND, LEFT, INITIAL ENCOUNTER: ICD-10-CM

## 2022-08-18 PROCEDURE — 97760 ORTHOTIC MGMT&TRAING 1ST ENC: CPT

## 2022-08-22 ENCOUNTER — OFFICE VISIT (OUTPATIENT)
Dept: ORTHOPEDICS CLINIC | Facility: CLINIC | Age: 42
End: 2022-08-22
Payer: COMMERCIAL

## 2022-08-22 VITALS — BODY MASS INDEX: 24.96 KG/M2 | HEIGHT: 67 IN | WEIGHT: 159 LBS

## 2022-08-22 DIAGNOSIS — S66.812A EXTENSOR TENDON RUPTURE OF HAND, LEFT, INITIAL ENCOUNTER: Primary | ICD-10-CM

## 2022-08-24 NOTE — PATIENT INSTRUCTIONS
Check blood pressure at home and let me know if persists above 140/90. We can have you come in the office on a MWF and we can check your blood pressure cuff to our office reading. Call to schedule appointment with gynecology for pap and mammogram     Increase escitalopram to 15mg daily (1.5 tablets).  Let me know when you need a refill by [employer] message or pharmacy can reach out

## 2022-09-02 ENCOUNTER — OFFICE VISIT (OUTPATIENT)
Dept: OCCUPATIONAL MEDICINE | Age: 42
End: 2022-09-02
Attending: PHYSICIAN ASSISTANT
Payer: COMMERCIAL

## 2022-09-02 PROCEDURE — 97110 THERAPEUTIC EXERCISES: CPT

## 2022-09-02 PROCEDURE — 97140 MANUAL THERAPY 1/> REGIONS: CPT

## 2022-09-02 NOTE — PROGRESS NOTES
Diagnosis:    EIP to EPL transfer    Referring Provider: No ref. provider found  Date of Evaluation:    8/18/22 (splint)    Precautions:  per [place] [facility] protocol Next MD visit:   none scheduled  Date of Surgery: 8/10/22   Insurance Primary/Secondary: [employer] / N/A     # Auth Visits: 12           Subjective: \"I feel like the thumb is irritated on the skin. I'm nervous to start moving it\"    Pain: 0/10      Objective: Digit 2-5 ROM is WFL  Wrist flexion R=90, L=30  Wrist extension R=65, L=30  R D1 MCP=65, IP=70  L D1 MCP=15, IP=5    Irritation of SBRN present    Assessment: Initiated gentle AROM per protocol. Wrist ROM performed separate from digit ROM. Very minimal AROM of the thumb noted. Pt to continue use of splint in between exercise sessions. Scar mobilization initiated as wounds healing well despite allergic reaction to steristrips per pt. Goals: (to be met in 2 visits)  Pt will demonstrate independence with don/doff splint. Pt will verbalize understanding of splint precautions and instructions for splint care. To be completed in 8 visits  Pt will increase wrist ROM to be [facility] [place] for use while dressing  Pt will increase D1 IP flexion to be at least 55 deg for use while manipulating small objects in hand  Pt will increase thumb DUMONT to be at least 100 deg for use while handling utensils  Will continue to upgrade as appropriate. Plan: Continue tx for ROM, scar mob. Date: 9/2/2022  TX#: 2/8 Date:                 TX#: 3/ Date:                 TX#: 4/ Date:                 TX#: 5/ Date:    Tx#: 6/   Scar mob       AROM to wrist       AROM to thumb  Each joint       Desensitization to SBRN    Padded strap for improved comfort       HEP: HEP upgrades in bold    Charges: 1 MT, 2 TE       Total Timed Treatment: 40 min  Total Treatment Time: 40 min

## 2022-09-06 ENCOUNTER — OFFICE VISIT (OUTPATIENT)
Dept: OCCUPATIONAL MEDICINE | Age: 42
End: 2022-09-06
Attending: PHYSICIAN ASSISTANT
Payer: COMMERCIAL

## 2022-09-06 PROCEDURE — 97110 THERAPEUTIC EXERCISES: CPT

## 2022-09-06 PROCEDURE — 97140 MANUAL THERAPY 1/> REGIONS: CPT

## 2022-09-06 NOTE — PROGRESS NOTES
Diagnosis:    EIP to EPL transfer    Referring Provider: No ref. provider found  Date of Evaluation:    8/18/22 (splint)    Precautions:  per [place] [facility] protocol Next MD visit:   none scheduled  Date of Surgery: 8/10/22   Insurance Primary/Secondary: [place] / N/A     # Auth Visits: 12           Subjective: \"I've been massaging the scars and trying to move the thumb. It doesn't move much though\"    Pain: 0/10      Objective: Digit 2-5 ROM is WFL  Wrist flexion R=90, L=33  Wrist extension R=65, L=35  R D1 MCP=65, IP=70  L D1 MCP=20, IP=10    Irritation of SBRN present    Assessment: Small improvement in ROM of the wrist and thumb following performance of ROM and scar mob in clinic. Pain well controlled with movement. Continued performance of digit ROM isolated from wrist ROM until pt is 4.5 weeks post op per protocol. Goals: (to be met in 2 visits)  Pt will demonstrate independence with don/doff splint. Pt will verbalize understanding of splint precautions and instructions for splint care. To be completed in 8 visits  Pt will increase wrist ROM to be [facility] [place] for use while dressing  Pt will increase D1 IP flexion to be at least 55 deg for use while manipulating small objects in hand  Pt will increase thumb DUMONT to be at least 100 deg for use while handling utensils  Will continue to upgrade as appropriate. Plan: Continue tx for ROM, scar mob. Date: 9/2/2022  TX#: 2/8 Date: 9/6/22                TX#: 3/8 Date:                 TX#: 4/ Date:                 TX#: 5/ Date:    Tx#: 6/   Scar mob Scar massage, rolling/nudging to scars      AROM to wrist Isolated wrist AROM      AROM to thumb  Each joint Isolated thumb AROM, each joint an composite      Desensitization to SBRN    Padded strap for improved comfort Gentle thumb circles    Digit 2-5 AROM      HEP: HEP upgrades in bold    Charges: 1 MT, 1 TE     Total Timed Treatment: 30 min  Total Treatment Time: 30 min

## 2022-09-09 ENCOUNTER — OFFICE VISIT (OUTPATIENT)
Dept: OCCUPATIONAL MEDICINE | Age: 42
End: 2022-09-09
Attending: PHYSICIAN ASSISTANT
Payer: COMMERCIAL

## 2022-09-09 PROCEDURE — 97140 MANUAL THERAPY 1/> REGIONS: CPT

## 2022-09-09 PROCEDURE — 97110 THERAPEUTIC EXERCISES: CPT

## 2022-09-09 NOTE — PROGRESS NOTES
Diagnosis:    EIP to EPL transfer    Referring Provider: No ref. provider found  Date of Evaluation:    8/18/22 (splint)    Precautions:  per [place] [facility] protocol Next MD visit:   none scheduled  Date of Surgery: 8/10/22   Insurance Primary/Secondary: [place] / N/A     # Auth Visits: 12           Subjective: \"There's no pain, sometimes it's a little painful when I try to really move it. \"    Pain: 0/10      Objective: Digit 2-5 ROM is WFL  Wrist flexion R=90, L=40  Wrist extension R=65, L=35  R D1 MCP=65, IP=70  L D1 MCP=25, IP=20    Irritation of SBRN present    Assessment: Continued improvement of wrist and thumb motion. Pain well controlled overall. Scar adhesions present and gentle IASTM performed to scars to increase mobility. Improvement in IP flexion of 10 deg by conclusion of tx. Pt encouraged to perform AROM exercises many times throughout the day. Goals: (to be met in 2 visits)  Pt will demonstrate independence with don/doff splint. Pt will verbalize understanding of splint precautions and instructions for splint care. To be completed in 8 visits  Pt will increase wrist ROM to be [facility] [place] for use while dressing  Pt will increase D1 IP flexion to be at least 55 deg for use while manipulating small objects in hand  Pt will increase thumb DUMONT to be at least 100 deg for use while handling utensils  Will continue to upgrade as appropriate. Plan: Continue tx for ROM, scar mob. Date: 9/2/2022  TX#: 2/8 Date: 9/6/22                TX#: 3/8 Date: 9/9/22                TX#: 4/8 Date:                 TX#: 5/ Date:    Tx#: 6/   Scar mob Scar massage, rolling/nudging to scars Scar massage, rolling/nudging to scars with IASTM     AROM to wrist Isolated wrist AROM Wrist AROM,   AAROM to wrist extension     AROM to thumb  Each joint Isolated thumb AROM, each joint an composite thumb AROM, each joint an composite     Desensitization to SBRN    Padded strap for improved comfort Gentle thumb circles    Digit 2-5 AROM Composite active flexion of wrist and thumb     HEP: HEP upgrades in bold    Charges: 2 MT, 1 TE     Total Timed Treatment: 40 min  Total Treatment Time: 40 min

## 2022-09-12 ENCOUNTER — OFFICE VISIT (OUTPATIENT)
Dept: OCCUPATIONAL MEDICINE | Age: 42
End: 2022-09-12
Attending: INTERNAL MEDICINE
Payer: COMMERCIAL

## 2022-09-12 PROCEDURE — 97140 MANUAL THERAPY 1/> REGIONS: CPT

## 2022-09-12 PROCEDURE — 97110 THERAPEUTIC EXERCISES: CPT

## 2022-09-12 NOTE — PROGRESS NOTES
Diagnosis:    EIP to EPL transfer    Referring Provider: No ref. provider found  Date of Evaluation:    8/18/22 (splint)    Precautions:  per [place] [facility] protocol Next MD visit:   none scheduled  Date of Surgery: 8/10/22   Insurance Primary/Secondary: [place] / N/A     # Auth Visits: 12            Progress Summary  Pt has attended 5 visits in Occupational Therapy. Subjective: \"There's no pain, sometimes it's a little painful when I try to really move it but it's not bad. \"    Pain: 0/10      Objective: Digit 2-5 ROM is WFL  Wrist flexion R=90, L=43  Wrist extension R=65, L=40    Right D1 MCP=65, IP=70    Left D1 MCP=30, IP=30  Difficulty with positioning for opposition      Assessment: Continued slow improvement of wrist and thumb motion. Pain well controlled overall. Scar adhesions present and extrinsic tightness persists. Improvement in IP flexion of 15 deg by conclusion of tx, though continued difficulty with opposition. Pt encouraged to perform AROM exercises many times throughout the day in all planes. Pt will continue to benefit from skilled therapy to maximize functional outcomes. Goals: (to be met in 2 visits)  Pt will demonstrate independence with don/doff splint. Pt will verbalize understanding of splint precautions and instructions for splint care. To be completed in 8 visits  Pt will increase wrist ROM to [facility] [place] for use while dressing  Pt will increase D1 IP flexion to be at least 55 deg for use while manipulating small objects in hand  Pt will increase thumb DUMONT to be at least 100 deg for use while handling utensils  Will continue to upgrade as appropriate. Plan: Continue tx for ROM, scar mob. Progress as appropriate. Patient/Family/Caregiver was advised of these findings, precautions, and treatment options and has agreed to actively participate in planning and for this course of care.     Thank you for your referral. If you have any questions, please contact me at Dept: [phone]. Sincerely,  Electronically signed by therapist: RASHMI Deras/L     Physician's certification required:  No  Please co-sign or sign and return this letter via fax as soon as possible to [phone]. I certify the need for these services furnished under this plan of treatment and while under my care. X___________________________________________________ Date____________________    Certification From: 6/91/7668  To:12/11/2022   Date: 9/2/2022  TX#: 2/8 Date: 9/6/22                TX#: 3/8 Date: 9/9/22                TX#: 4/8 Date: 9/12/22                TX#: 5/8 Date:    Tx#: 6/   Scar mob Scar massage, rolling/nudging to scars Scar massage, rolling/nudging to scars with IASTM Scar mobilization    AROM to wrist Isolated wrist AROM Wrist AROM,   AAROM to wrist extension Wrist AROM,   AAROM to wrist extension    AROM to thumb  Each joint Isolated thumb AROM, each joint an composite thumb AROM, each joint an composite thumb AROM, each joint an composite    Desensitization to SBRN    Padded strap for improved comfort Gentle thumb circles    Digit 2-5 AROM Composite active flexion of wrist and thumb Composite active flexion of wrist and thumb    Thumb alphabet    HEP: HEP upgrades in bold    Charges: 2 MT, 1 TE     Total Timed Treatment: 40 min  Total Treatment Time: 40 min

## 2022-09-13 ENCOUNTER — OFFICE VISIT (OUTPATIENT)
Dept: ORTHOPEDICS CLINIC | Facility: CLINIC | Age: 42
End: 2022-09-13
Payer: COMMERCIAL

## 2022-09-13 ENCOUNTER — APPOINTMENT (OUTPATIENT)
Dept: OCCUPATIONAL MEDICINE | Age: 42
End: 2022-09-13
Payer: COMMERCIAL

## 2022-09-13 VITALS — WEIGHT: 155 LBS | HEIGHT: 67 IN | BODY MASS INDEX: 24.33 KG/M2

## 2022-09-13 DIAGNOSIS — S66.812A EXTENSOR TENDON RUPTURE OF HAND, LEFT, INITIAL ENCOUNTER: Primary | ICD-10-CM

## 2022-09-13 PROCEDURE — 99024 POSTOP FOLLOW-UP VISIT: CPT | Performed by: PHYSICIAN ASSISTANT

## 2022-09-13 PROCEDURE — 3008F BODY MASS INDEX DOCD: CPT | Performed by: PHYSICIAN ASSISTANT

## 2022-09-14 RX ORDER — ESCITALOPRAM OXALATE 10 MG/1
15 TABLET ORAL DAILY
Qty: 135 TABLET | Refills: 1 | Status: SHIPPED | OUTPATIENT
Start: 2022-09-14

## 2022-09-16 ENCOUNTER — OFFICE VISIT (OUTPATIENT)
Dept: OCCUPATIONAL MEDICINE | Age: 42
End: 2022-09-16
Attending: INTERNAL MEDICINE
Payer: COMMERCIAL

## 2022-09-16 PROCEDURE — 97110 THERAPEUTIC EXERCISES: CPT

## 2022-09-16 PROCEDURE — 97140 MANUAL THERAPY 1/> REGIONS: CPT

## 2022-09-16 NOTE — PROGRESS NOTES
Diagnosis:    EIP to EPL transfer    Referring Provider: No ref. provider found  Date of Evaluation:    8/18/22 (splint)    Precautions:  per [place] [facility] protocol Next MD visit:   none scheduled  Date of Surgery: 8/10/22   Insurance Primary/Secondary: [place] / N/[place]     # Auth Visits: 12            Progress Summary  Pt has attended 6 visits in Occupational Therapy. Subjective: \"I feel like it's still not moving. \"  Pt leaving for a 1 week vacation    Pain: 0/10      Objective: Digit 2-5 ROM is WFL  Wrist flexion R=90, L=45  Wrist extension R=65, L=45    Right D1 MCP=65, IP=70    Left D1 MCP=30, IP=30  Difficulty with positioning for opposition      Assessment: Continued slow improvement of wrist and thumb motion. Pain well controlled overall. Scar adhesions present and extrinsic tightness persists. Pt encouraged to perform AROM exercises many times throughout the day in all planes. Pt to continue with splint for the next week while on vacation. Pt will continue to benefit from skilled therapy to maximize functional outcomes. Goals: (to be met in 2 visits)  Pt will demonstrate independence with don/doff splint. Pt will verbalize understanding of splint precautions and instructions for splint care. To be completed in 8 visits  Pt will [facility] wrist [facility] [place] [place] for use while dressing  Pt will increase D1 IP flexion to be at least 55 deg for use while manipulating small objects in hand  Pt will increase thumb DUMONT to be at least 100 deg for use while handling utensils  Will continue to upgrade as appropriate. Plan: Continue tx for ROM, scar mob. Progress as appropriate. Patient/Family/Caregiver was advised of these findings, precautions, and treatment options and has agreed to actively participate in planning and for this course of care. Thank you for your referral. If you have any questions, please contact me at Dept: [phone].     Sincerely,  Electronically signed by therapist: RASHMI Upton/L     Physician's certification required:  No  Please co-sign or sign and return this letter via fax as soon as possible to [phone]. I certify the need for these services furnished under this plan of treatment and while under my care.     X___________________________________________________ Date____________________    Certification From: 0/29/3875  To:12/11/2022   Date: 9/2/2022  TX#: 2/8 Date: 9/6/22                TX#: 3/8 Date: 9/9/22                TX#: 4/8 Date: 9/12/22                TX#: 5/8 Date: 9/16/22  Tx#: 6/8   Scar mob Scar massage, rolling/nudging to scars Scar massage, rolling/nudging to scars with IASTM Scar mobilization Scar mobilization   AROM to wrist Isolated wrist AROM Wrist AROM,   AAROM to wrist extension Wrist AROM,   AAROM to wrist extension Wrist AROM,   AAROM to wrist extension   AROM to thumb  Each joint Isolated thumb AROM, each joint an composite thumb AROM, each joint an composite thumb AROM, each joint an composite thumb AROM, each joint an composite   Desensitization to SBRN    Padded strap for improved comfort Gentle thumb circles    Digit 2-5 AROM Composite active flexion of wrist and thumb Composite active flexion of wrist and thumb    Thumb alphabet  small chips 1 at a time    In-hand manipulation with poker chip   HEP: HEP upgrades in bold    Charges: 2 MT, 1 TE     Total Timed Treatment: 40 min  Total Treatment Time: 40 min

## 2022-09-23 ENCOUNTER — OFFICE VISIT (OUTPATIENT)
Dept: OCCUPATIONAL MEDICINE | Age: 42
End: 2022-09-23
Attending: PHYSICIAN ASSISTANT
Payer: COMMERCIAL

## 2022-09-23 PROCEDURE — 97140 MANUAL THERAPY 1/> REGIONS: CPT

## 2022-09-23 PROCEDURE — 97110 THERAPEUTIC EXERCISES: CPT

## 2022-09-23 NOTE — PROGRESS NOTES
Diagnosis:    EIP to EPL transfer    Referring Provider: No ref. provider found  Date of Evaluation:    8/18/22 (splint)    Precautions:  per [place] [facility] protocol Next MD visit:   none scheduled  Date of Surgery: 8/10/22   Insurance Primary/Secondary: [place] / N[place]     # Auth Visits: 12            Progress Summary  Pt has attended 7 visits in Occupational Therapy. Subjective: \"I switched to to smaller splint this morning. \"    Pain: 0/10      Objective: Digit 2-5 ROM is WFL  Wrist flexion R=90, L=45  Wrist extension R=65, L=45    Right D1 MCP=65, IP=70    Left D1 MCP=30, IP=30  Difficulty with positioning for opposition      Assessment: Continued slow improvement of wrist and thumb motion. Pain well controlled overall. Scar adhesions present and extrinsic tightness persists. Initiated gentle progressive PROM to thumb today, isolated from wrist PROM. Pt educated in performance at home. Pt also switched to thumb based splint per protocol. Goals: (to be met in 2 visits)  Pt will demonstrate independence with don/doff splint. Pt will verbalize understanding of splint precautions and instructions for splint care. To be completed in 8 visits  Pt will increase wrist [facility] to [facility] [place] [place] for use while dressing  Pt will increase D1 IP flexion to be at least 55 deg for use while manipulating small objects in hand  Pt will increase thumb DUMONT to be at least 100 deg for use while handling utensils  Will continue to upgrade as appropriate. Plan: Continue tx for ROM, scar mob. Progress as appropriate. Patient/Family/Caregiver was advised of these findings, precautions, and treatment options and has agreed to actively participate in planning and for this course of care. Thank you for your referral. If you have any questions, please contact me at Dept: [phone].     Sincerely,  Electronically signed by therapist: Angeles Salvador OTR/L     Physician's certification required:  No  Please co-sign or sign and return this letter via fax as soon as possible to [phone]. I certify the need for these services furnished under this plan of treatment and while under my care.     X___________________________________________________ Date____________________    Certification From: 3/69/6250  To:12/11/2022   Date: 9/2/2022  TX#: 2/8 Date: 9/6/22                TX#: 3/8 Date: 9/9/22                TX#: 4/8 Date: 9/12/22                TX#: 5/8 Date: 9/16/22  Tx#: 6/8 9/23/22  Tx: 7/8   Scar mob Scar massage, rolling/nudging to scars Scar massage, rolling/nudging to scars with IASTM Scar mobilization Scar mobilization Scar mobilization   AROM to wrist Isolated wrist AROM Wrist AROM,   AAROM to wrist extension Wrist AROM,   AAROM to wrist extension Wrist AROM,   AAROM to wrist extension A/AA/PROM to wrist   AROM to thumb  Each joint Isolated thumb AROM, each joint an composite thumb AROM, each joint an composite thumb AROM, each joint an composite thumb AROM, each joint an composite A/AA/PROM to thumb   Desensitization to SBRN    Padded strap for improved comfort Gentle thumb circles    Digit 2-5 AROM Composite active flexion of wrist and thumb Composite active flexion of wrist and thumb    Thumb alphabet  small chips 1 at a time    In-hand manipulation with poker chip Blokus for in-hand manipulation    HEP: HEP upgrades in bold    Charges: 2 MT, 1 TE     Total Timed Treatment: 40 min  Total Treatment Time: 40 min

## 2022-09-27 ENCOUNTER — OFFICE VISIT (OUTPATIENT)
Dept: OCCUPATIONAL MEDICINE | Age: 42
End: 2022-09-27
Attending: PHYSICIAN ASSISTANT
Payer: COMMERCIAL

## 2022-09-27 PROCEDURE — 97110 THERAPEUTIC EXERCISES: CPT

## 2022-09-27 PROCEDURE — 97140 MANUAL THERAPY 1/> REGIONS: CPT

## 2022-09-27 PROCEDURE — 97022 WHIRLPOOL THERAPY: CPT

## 2022-09-27 NOTE — PROGRESS NOTES
Diagnosis:    EIP to EPL transfer    Referring Provider: No ref. provider found  Date of Evaluation:    8/18/22 (splint)    Precautions:  per [place] [facility] protocol Next MD visit:   none scheduled  Date of Surgery: 8/10/22   Insurance Primary/Secondary: [place] / N[place]     # Auth Visits: 12            Progress Summary  Pt has attended 8 visits in Occupational Therapy. Subjective: \"It's pretty stiff. But it's ok. \"    Pain: 0/10      Objective: Digit 2-5 ROM is WFL  Wrist flexion R=90, L=45  Wrist extension R=65, L=45    Right D1 MCP=65, IP=70    Left D1 MCP=40, IP=40  Difficulty with positioning for opposition      Assessment: Improvement in D1 AROM following use of heat. Progressive PROM performed to thumb isolated from wrist.  Will add combined ROM next visit. Continued difficulty with opposition positioning. Difficulty with in hand manipulation with small objects. Goals: (to be met in 2 visits)  Pt will demonstrate independence with don/doff splint. Pt will verbalize understanding of splint precautions and instructions for splint care. To be completed in 8 visits  Pt will increase wrist ROM to be [facility] [place] for use while dressing  Pt will increase D1 IP flexion to be at least 55 deg for use while manipulating small objects in hand  Pt will increase thumb DUMONT to be at least 100 deg for use while handling utensils  Will continue to upgrade as appropriate. Plan: Continue tx for ROM, scar mob. Progress as appropriate. Patient/Family/Caregiver was advised of these findings, precautions, and treatment options and has agreed to actively participate in planning and for this course of care. Thank you for your referral. If you have any questions, please contact me at Dept: [phone]. Sincerely,  Electronically signed by therapist: RASHMI Corona/HUMA     Physician's certification required:  No  Please co-sign or sign and return this letter via fax as soon as possible to [phone].    I certify the need for these services furnished under this plan of treatment and while under my care.     X___________________________________________________ Date____________________    Certification From: 9/04/0608  To:12/11/2022   Date: 9/2/2022  TX#: 2/8 Date: 9/6/22                TX#: 3/8 Date: 9/9/22                TX#: 4/8 Date: 9/12/22                TX#: 5/8 Date: 9/16/22  Tx#: 6/8 9/23/22  Tx: 7/8 9/27/22  Tx: 8/8   Scar mob Scar massage, rolling/nudging to scars Scar massage, rolling/nudging to scars with IASTM Scar mobilization Scar mobilization Scar mobilization Dry heat whirlpool x 10 min with AROM   AROM to wrist Isolated wrist AROM Wrist AROM,   AAROM to wrist extension Wrist AROM,   AAROM to wrist extension Wrist AROM,   AAROM to wrist extension A/AA/PROM to wrist Scar mobilization   AROM to thumb  Each joint Isolated thumb AROM, each joint an composite thumb AROM, each joint an composite thumb AROM, each joint an composite thumb AROM, each joint an composite A/AA/PROM to thumb A/AA/PROM to thumb  IP isolation   Desensitization to SBRN    Padded strap for improved comfort Gentle thumb circles    Digit 2-5 AROM Composite active flexion of wrist and thumb Composite active flexion of wrist and thumb    Thumb alphabet  small chips 1 at a time    In-hand manipulation with poker chip Blokus for in-hand manipulation  Thumb arcs    In-hand manipulation with tiny pegs    Opposition tasks   HEP: HEP upgrades in bold    Charges: 1 WP, 1 MT, 1 TE     Total Timed Treatment: 40 min  Total Treatment Time: 40 min

## 2022-09-30 ENCOUNTER — OFFICE VISIT (OUTPATIENT)
Dept: OCCUPATIONAL MEDICINE | Age: 42
End: 2022-09-30
Attending: PHYSICIAN ASSISTANT
Payer: COMMERCIAL

## 2022-09-30 PROCEDURE — 97022 WHIRLPOOL THERAPY: CPT

## 2022-09-30 PROCEDURE — 97110 THERAPEUTIC EXERCISES: CPT

## 2022-09-30 PROCEDURE — 97140 MANUAL THERAPY 1/> REGIONS: CPT

## 2022-09-30 NOTE — PROGRESS NOTES
Diagnosis:    EIP to EPL transfer    Referring Provider: No ref. provider found  Date of Evaluation:    8/18/22 (splint)    Precautions:  per [place] [facility] protocol Next MD visit:   none scheduled  Date of Surgery: 8/10/22   Insurance Primary/Secondary: [place] / N[place]A     # Auth Visits: 12            Progress Summary  Pt has attended 9 visits in Occupational Therapy. Subjective: \"It's good, but stiff still. There's a little soreness from the exercises. \"    Pain: 0/10      Objective: Digit 2-5 ROM is WFL  Wrist flexion R=90, L=45  Wrist extension R=65, L=45    Right D1 MCP=65, IP=70    Left D1 MCP=45, IP=40  Difficulty with positioning for opposition  Pt is now 7 weeks post op    Assessment: Improvement in D1 AROM following use of heat. Progressive PROM performed to thumb isolated from wrist.  Will add combined ROM next visit. Initiated gentle progressive strengthening of  as pt is now beyond 7 weeks post op. Pt advised to begin weaning self from splint. Goals: (to be met in 2 visits)  Pt will demonstrate independence with don/doff splint. Pt will verbalize understanding of splint precautions and instructions for splint care. To be completed in 8 visits  Pt will increase wrist ROM to be [facility] [place] for use while dressing  Pt will increase D1 IP flexion to be at least 55 deg for use while manipulating small objects in hand  Pt will increase thumb DUMONT to be at least 100 deg for use while handling utensils  Will continue to upgrade as appropriate. Plan: Continue tx for ROM, scar mob. Progress as appropriate. Continue strengthening. Patient/Family/Caregiver was advised of these findings, precautions, and treatment options and has agreed to actively participate in planning and for this course of care. Thank you for your referral. If you have any questions, please contact me at Dept: [phone].     Sincerely,  Electronically signed by therapist: RASHMI Toney/HUMA     [de-identified] certification required:  No  Please co-sign or sign and return this letter via fax as soon as possible to [phone]. I certify the need for these services furnished under this plan of treatment and while under my care.     X___________________________________________________ Date____________________    Certification From: 2/66/7942  To:12/11/2022   Date: 9/9/22                TX#: 4/8 Date: 9/12/22                TX#: 5/8 Date: 9/16/22  Tx#: 6/8 9/23/22  Tx: 7/8 9/27/22  Tx: 8/8 9/30/22  Tx: 9/12   Scar massage, rolling/nudging to scars with IASTM Scar mobilization Scar mobilization Scar mobilization Dry heat whirlpool x 10 min with AROM Dry heat whirlpool x 10 min with AROM   Wrist AROM,   AAROM to wrist extension Wrist AROM,   AAROM to wrist extension Wrist AROM,   AAROM to wrist extension A/AA/PROM to wrist Scar mobilization Scar mobilization   thumb AROM, each joint an composite thumb AROM, each joint an composite thumb AROM, each joint an composite A/AA/PROM to thumb A/AA/PROM to thumb  IP isolation A/AA/PROM to thumb  IP isolation    Combined D1 flexion with gentle wrist flexion   Composite active flexion of wrist and thumb Composite active flexion of wrist and thumb    Thumb alphabet  small chips 1 at a time    In-hand manipulation with poker chip Blokus for in-hand manipulation  Thumb arcs    In-hand manipulation with tiny pegs    Opposition tasks Yellow putty exercises  -/reposition  -rolling/gentle tip pinch  -lateral pinch  -tripod pinch    Isometric IP flexion hold     HEP: HEP upgrades in bold    Charges: 1 WP, 1 MT, 1 TE     Total Timed Treatment: 40 min  Total Treatment Time: 40 min

## 2022-10-03 ENCOUNTER — TELEPHONE (OUTPATIENT)
Dept: PHYSICAL THERAPY | Facility: HOSPITAL | Age: 42
End: 2022-10-03

## 2022-10-04 ENCOUNTER — APPOINTMENT (OUTPATIENT)
Dept: OCCUPATIONAL MEDICINE | Age: 42
End: 2022-10-04
Attending: PHYSICIAN ASSISTANT

## 2022-10-07 ENCOUNTER — OFFICE VISIT (OUTPATIENT)
Dept: OCCUPATIONAL MEDICINE | Age: 42
End: 2022-10-07
Attending: PHYSICIAN ASSISTANT

## 2022-10-07 PROCEDURE — 97110 THERAPEUTIC EXERCISES: CPT

## 2022-10-07 PROCEDURE — 97140 MANUAL THERAPY 1/> REGIONS: CPT

## 2022-10-07 NOTE — PROGRESS NOTES
Diagnosis:    EIP to EPL transfer    Referring Provider: No ref. provider found  Date of Evaluation:    8/18/22 (splint)    Precautions:  per [place] [facility] protocol Next MD visit:   none scheduled  Date of Surgery: 8/10/22   Insurance Primary/Secondary: Self pay    # Auth Visits: 12            Progress Summary  Pt has attended 10 visits in Occupational Therapy. Subjective: \"I came in to see what I can do on my own since I don't have insurance right now. \"    Pain: 0/10      Objective: Digit 2-5 ROM is WFL  Wrist flexion R=90, L=60  Wrist extension R=65, L=65    Right D1 MCP=65, IP=70    Left D1 MCP=45, IP=40  Difficulty with positioning for opposition  Pt is now 8 weeks post op    Assessment: Continued improvement of wrist ROM noted. Improvement in ROM of D1 noted following performance of exercises in clinic. HEP upgraded to progress independently as able as pt no longer has insurance coverage and would like to hold off on therapy at this point. Discussed activity progression and limitations at this point. Goals: (to be met in 2 visits)  Pt will demonstrate independence with don/doff splint. Pt will verbalize understanding of splint precautions and instructions for splint care. To be completed in 8 visits  Pt will increase wrist ROM to be [place] [place] for use while dressing: progressed  Pt will increase D1 IP flexion to be at least 55 deg for use while manipulating small objects in hand: 40 deg  Pt will increase thumb DUMONT to be at least 100 deg for use while handling utensils: 85 deg  Will continue to upgrade as appropriate. Plan: Hold therapy at this point due to insurance coverage. Pt will notify clinic with intention to return. Will d/c if no contact in 1 month. Patient/Family/Caregiver was advised of these findings, precautions, and treatment options and has agreed to actively participate in planning and for this course of care.     Thank you for your referral. If you have any questions, please contact me at Dept: [phone]. Sincerely,  Electronically signed by therapist: RASHMI Gould/HUMA     Physician's certification required:  No  Please co-sign or sign and return this letter via fax as soon as possible to [phone]. I certify the need for these services furnished under this plan of treatment and while under my care. X___________________________________________________ Date____________________    Certification From: 5/21/2989  To:12/11/2022   Date: 9/12/22                TX#: 5/8 Date: 9/16/22  Tx#: 6/8 9/23/22  Tx: 7/8 9/27/22  Tx: 8/8 9/30/22  Tx: 9/12 10/7/22  Tx: 10/12   Scar mobilization Scar mobilization Scar mobilization Dry heat whirlpool x 10 min with AROM Dry heat whirlpool x 10 min with AROM A/AA/PROM to thumb  IP isolation  Joint mob to IP with D1 extended.     Wrist AROM,   AAROM to wrist extension Wrist AROM,   AAROM to wrist extension A/AA/PROM to wrist Scar mobilization Scar mobilization Scar mobilization, gentle retraction   thumb AROM, each joint an composite thumb AROM, each joint an composite A/AA/PROM to thumb A/AA/PROM to thumb  IP isolation A/AA/PROM to thumb  IP isolation    Combined D1 flexion with gentle wrist flexion PREs 1 lb wrist flexion  Wrist extension  RD/UD  Each x 20   Composite active flexion of wrist and thumb    Thumb alphabet  small chips 1 at a time    In-hand manipulation with poker chip Blokus for in-hand manipulation  Thumb arcs    In-hand manipulation with tiny pegs    Opposition tasks Yellow putty exercises  -/reposition  -rolling/gentle tip pinch  -lateral pinch  -tripod pinch    Isometric IP flexion hold   pink putty exercises  -/reposition  -rolling/gentle tip pinch  -lateral pinch  -tripod pinch  -IP flexion  -IP extension        Yellow theraband  -tripod pinch/pull  -light D1 extension  -lateral pinch  Each x 20   HEP: HEP upgrades in bold    Charges: 1 MT, 2 TE     Total Timed Treatment: 40 min  Total Treatment Time: 40 min

## 2022-10-11 ENCOUNTER — APPOINTMENT (OUTPATIENT)
Dept: OCCUPATIONAL MEDICINE | Age: 42
End: 2022-10-11
Attending: PHYSICIAN ASSISTANT

## 2022-10-14 ENCOUNTER — APPOINTMENT (OUTPATIENT)
Dept: OCCUPATIONAL MEDICINE | Age: 42
End: 2022-10-14
Attending: PHYSICIAN ASSISTANT

## 2022-10-18 ENCOUNTER — APPOINTMENT (OUTPATIENT)
Dept: OCCUPATIONAL MEDICINE | Age: 42
End: 2022-10-18
Attending: PHYSICIAN ASSISTANT

## 2022-10-21 ENCOUNTER — APPOINTMENT (OUTPATIENT)
Dept: OCCUPATIONAL MEDICINE | Age: 42
End: 2022-10-21
Attending: PHYSICIAN ASSISTANT

## 2022-11-08 ENCOUNTER — TELEPHONE (OUTPATIENT)
Dept: INTERNAL MEDICINE CLINIC | Facility: CLINIC | Age: 42
End: 2022-11-08

## 2022-11-08 NOTE — TELEPHONE ENCOUNTER
Incoming mammogram result from [facility]     Findings:   Digital mammographic imaging was performed   There are no suspicious masses, calcifications, or areas architectural distortion. Bilateral implants are present. There are benign appearing calcifications. Routine screening mammogram in 1 year. Placed report in your inbasket.

## 2022-11-15 ENCOUNTER — OFFICE VISIT (OUTPATIENT)
Dept: ORTHOPEDICS CLINIC | Facility: CLINIC | Age: 42
End: 2022-11-15
Payer: COMMERCIAL

## 2022-11-15 VITALS — BODY MASS INDEX: 24.33 KG/M2 | HEIGHT: 67 IN | WEIGHT: 155 LBS

## 2022-11-15 DIAGNOSIS — S66.812A EXTENSOR TENDON RUPTURE OF HAND, LEFT, INITIAL ENCOUNTER: Primary | ICD-10-CM

## 2022-11-15 PROCEDURE — 3008F BODY MASS INDEX DOCD: CPT | Performed by: ORTHOPAEDIC SURGERY

## 2022-11-15 PROCEDURE — 99024 POSTOP FOLLOW-UP VISIT: CPT | Performed by: ORTHOPAEDIC SURGERY

## 2022-12-29 ENCOUNTER — OFFICE VISIT (OUTPATIENT)
Dept: ORTHOPEDICS CLINIC | Facility: CLINIC | Age: 42
End: 2022-12-29
Payer: COMMERCIAL

## 2022-12-29 VITALS — HEIGHT: 67 IN | WEIGHT: 155 LBS | BODY MASS INDEX: 24.33 KG/M2

## 2022-12-29 DIAGNOSIS — S66.812A EXTENSOR TENDON RUPTURE OF HAND, LEFT, INITIAL ENCOUNTER: Primary | ICD-10-CM

## 2022-12-29 PROCEDURE — 99212 OFFICE O/P EST SF 10 MIN: CPT | Performed by: ORTHOPAEDIC SURGERY

## 2022-12-29 PROCEDURE — 3008F BODY MASS INDEX DOCD: CPT | Performed by: ORTHOPAEDIC SURGERY

## 2023-02-18 ENCOUNTER — PATIENT MESSAGE (OUTPATIENT)
Facility: CLINIC | Age: 43
End: 2023-02-18

## 2023-02-18 DIAGNOSIS — E21.3 HYPERPARATHYROIDISM (HCC): Primary | ICD-10-CM

## 2023-02-18 DIAGNOSIS — E83.52 HYPERCALCEMIA: ICD-10-CM

## 2023-02-20 ENCOUNTER — LAB ENCOUNTER (OUTPATIENT)
Dept: LAB | Age: 43
End: 2023-02-20
Attending: INTERNAL MEDICINE
Payer: COMMERCIAL

## 2023-02-20 DIAGNOSIS — Z00.00 LABORATORY EXAM ORDERED AS PART OF ROUTINE GENERAL MEDICAL EXAMINATION: ICD-10-CM

## 2023-02-20 DIAGNOSIS — E83.52 HYPERCALCEMIA: ICD-10-CM

## 2023-02-20 DIAGNOSIS — E21.3 HYPERPARATHYROIDISM (HCC): ICD-10-CM

## 2023-02-20 LAB
BASOPHILS # BLD AUTO: 0.02 X10(3) UL (ref 0–0.2)
BASOPHILS NFR BLD AUTO: 0.4 %
CALCIUM BLD-MCNC: 10.2 MG/DL (ref 8.5–10.1)
CHOLEST SERPL-MCNC: 203 MG/DL (ref ?–200)
CREAT BLD-MCNC: 0.64 MG/DL
EOSINOPHIL # BLD AUTO: 0.27 X10(3) UL (ref 0–0.7)
EOSINOPHIL NFR BLD AUTO: 5 %
ERYTHROCYTE [DISTWIDTH] IN BLOOD BY AUTOMATED COUNT: 13.1 %
FASTING PATIENT LIPID ANSWER: YES
GFR SERPLBLD BASED ON 1.73 SQ M-ARVRAT: 111 ML/MIN/1.73M2 (ref 60–?)
HCT VFR BLD AUTO: 41.1 %
HDLC SERPL-MCNC: 62 MG/DL (ref 40–59)
HGB BLD-MCNC: 13.6 G/DL
IMM GRANULOCYTES # BLD AUTO: 0.01 X10(3) UL (ref 0–1)
IMM GRANULOCYTES NFR BLD: 0.2 %
LDLC SERPL CALC-MCNC: 118 MG/DL (ref ?–100)
LYMPHOCYTES # BLD AUTO: 1.8 X10(3) UL (ref 1–4)
LYMPHOCYTES NFR BLD AUTO: 33 %
MCH RBC QN AUTO: 31.5 PG (ref 26–34)
MCHC RBC AUTO-ENTMCNC: 33.1 G/DL (ref 31–37)
MCV RBC AUTO: 95.1 FL
MONOCYTES # BLD AUTO: 0.49 X10(3) UL (ref 0.1–1)
MONOCYTES NFR BLD AUTO: 9 %
NEUTROPHILS # BLD AUTO: 2.86 X10 (3) UL (ref 1.5–7.7)
NEUTROPHILS # BLD AUTO: 2.86 X10(3) UL (ref 1.5–7.7)
NEUTROPHILS NFR BLD AUTO: 52.4 %
NONHDLC SERPL-MCNC: 141 MG/DL (ref ?–130)
PLATELET # BLD AUTO: 271 10(3)UL (ref 150–450)
PTH-INTACT SERPL-MCNC: 107.8 PG/ML (ref 18.5–88)
RBC # BLD AUTO: 4.32 X10(6)UL
T4 FREE SERPL-MCNC: 0.8 NG/DL (ref 0.8–1.7)
TRIGL SERPL-MCNC: 133 MG/DL (ref 30–149)
TSI SER-ACNC: 4.07 MIU/ML (ref 0.36–3.74)
VIT D+METAB SERPL-MCNC: 26.2 NG/ML (ref 30–100)
VLDLC SERPL CALC-MCNC: 23 MG/DL (ref 0–30)
WBC # BLD AUTO: 5.5 X10(3) UL (ref 4–11)

## 2023-02-20 PROCEDURE — 80061 LIPID PANEL: CPT | Performed by: INTERNAL MEDICINE

## 2023-02-20 PROCEDURE — 84439 ASSAY OF FREE THYROXINE: CPT | Performed by: INTERNAL MEDICINE

## 2023-02-20 PROCEDURE — 83970 ASSAY OF PARATHORMONE: CPT | Performed by: STUDENT IN AN ORGANIZED HEALTH CARE EDUCATION/TRAINING PROGRAM

## 2023-02-20 PROCEDURE — 82306 VITAMIN D 25 HYDROXY: CPT | Performed by: STUDENT IN AN ORGANIZED HEALTH CARE EDUCATION/TRAINING PROGRAM

## 2023-02-20 PROCEDURE — 84443 ASSAY THYROID STIM HORMONE: CPT | Performed by: INTERNAL MEDICINE

## 2023-02-20 PROCEDURE — 85025 COMPLETE CBC W/AUTO DIFF WBC: CPT | Performed by: INTERNAL MEDICINE

## 2023-02-20 PROCEDURE — 82310 ASSAY OF CALCIUM: CPT | Performed by: STUDENT IN AN ORGANIZED HEALTH CARE EDUCATION/TRAINING PROGRAM

## 2023-02-20 PROCEDURE — 82565 ASSAY OF CREATININE: CPT | Performed by: STUDENT IN AN ORGANIZED HEALTH CARE EDUCATION/TRAINING PROGRAM

## 2023-02-20 NOTE — TELEPHONE ENCOUNTER
Patient has an appointment scheduled on 3/13/23. Had repeat Calcium done with Dr. Adán Gramajo 11/14/22- 10.4.     24 hour urine Calcium and Creat done in 05/2022.

## 2023-02-20 NOTE — TELEPHONE ENCOUNTER
From: Marion Mohan  To: Santosh Wilson MD  Sent: 2/18/2023 9:38 PM CST  Subject: Labs / follow up appt     Hi, I have an appt coming up in March. Am I supposed to get lab work? If so is the order entered?      Thanks

## 2023-02-20 NOTE — TELEPHONE ENCOUNTER
Per Dr. Rony Valerio, \"PTH, Ca, Vit D, Cr\". Lab orders entered. MyChart reply sent to patient. Will close this encounter.

## 2023-03-07 DIAGNOSIS — F41.9 ANXIETY: ICD-10-CM

## 2023-03-08 ENCOUNTER — OFFICE VISIT (OUTPATIENT)
Dept: INTERNAL MEDICINE CLINIC | Facility: CLINIC | Age: 43
End: 2023-03-08
Payer: COMMERCIAL

## 2023-03-08 VITALS
HEART RATE: 72 BPM | RESPIRATION RATE: 16 BRPM | HEIGHT: 67 IN | SYSTOLIC BLOOD PRESSURE: 128 MMHG | OXYGEN SATURATION: 99 % | TEMPERATURE: 98 F | BODY MASS INDEX: 26.4 KG/M2 | WEIGHT: 168.19 LBS | DIASTOLIC BLOOD PRESSURE: 78 MMHG

## 2023-03-08 DIAGNOSIS — E55.9 HYPOVITAMINOSIS D: ICD-10-CM

## 2023-03-08 DIAGNOSIS — R79.89 ELEVATED TSH: ICD-10-CM

## 2023-03-08 DIAGNOSIS — F41.9 ANXIETY: ICD-10-CM

## 2023-03-08 DIAGNOSIS — Z00.00 ENCOUNTER FOR ROUTINE ADULT MEDICAL EXAMINATION: Primary | ICD-10-CM

## 2023-03-08 DIAGNOSIS — Z12.11 SCREENING FOR COLON CANCER: ICD-10-CM

## 2023-03-08 DIAGNOSIS — R53.83 FATIGUE, UNSPECIFIED TYPE: ICD-10-CM

## 2023-03-08 PROCEDURE — 3074F SYST BP LT 130 MM HG: CPT | Performed by: INTERNAL MEDICINE

## 2023-03-08 PROCEDURE — 3008F BODY MASS INDEX DOCD: CPT | Performed by: INTERNAL MEDICINE

## 2023-03-08 PROCEDURE — 99396 PREV VISIT EST AGE 40-64: CPT | Performed by: INTERNAL MEDICINE

## 2023-03-08 PROCEDURE — 3078F DIAST BP <80 MM HG: CPT | Performed by: INTERNAL MEDICINE

## 2023-03-08 RX ORDER — IBUPROFEN 600 MG/1
TABLET ORAL
COMMUNITY
Start: 2023-02-07

## 2023-03-08 RX ORDER — ALPRAZOLAM 0.25 MG/1
0.25 TABLET ORAL 2 TIMES DAILY PRN
Qty: 30 TABLET | Refills: 5 | Status: SHIPPED | OUTPATIENT
Start: 2023-03-08

## 2023-03-08 RX ORDER — ALPRAZOLAM 0.25 MG/1
TABLET ORAL
Qty: 30 TABLET | Refills: 0 | OUTPATIENT
Start: 2023-03-08

## 2023-03-08 NOTE — PATIENT INSTRUCTIONS
Repeat blood work first week of April or later     Follow up with gastroenterology in future for colonoscopy

## 2023-03-13 ENCOUNTER — OFFICE VISIT (OUTPATIENT)
Facility: CLINIC | Age: 43
End: 2023-03-13
Payer: COMMERCIAL

## 2023-03-13 VITALS — HEART RATE: 84 BPM | OXYGEN SATURATION: 99 % | DIASTOLIC BLOOD PRESSURE: 80 MMHG | SYSTOLIC BLOOD PRESSURE: 128 MMHG

## 2023-03-13 DIAGNOSIS — E21.3 HYPERPARATHYROIDISM (HCC): ICD-10-CM

## 2023-03-13 DIAGNOSIS — E83.52 HYPERCALCEMIA: ICD-10-CM

## 2023-03-13 DIAGNOSIS — E03.9 HYPOTHYROIDISM, UNSPECIFIED TYPE: Primary | ICD-10-CM

## 2023-03-13 RX ORDER — LEVOTHYROXINE SODIUM 0.05 MG/1
50 TABLET ORAL
Qty: 90 TABLET | Refills: 0 | Status: SHIPPED | OUTPATIENT
Start: 2023-03-13

## 2023-03-20 ENCOUNTER — PATIENT MESSAGE (OUTPATIENT)
Facility: CLINIC | Age: 43
End: 2023-03-20

## 2023-03-20 ENCOUNTER — TELEPHONE (OUTPATIENT)
Dept: INTERNAL MEDICINE CLINIC | Facility: CLINIC | Age: 43
End: 2023-03-20

## 2023-03-20 NOTE — TELEPHONE ENCOUNTER
Received request from [facility] [place] [address] [place]  C/O Underwriting benefits review  [address]  [address], [address]    Forwarded to medical records via fax, received confirmation.

## 2023-03-20 NOTE — TELEPHONE ENCOUNTER
From: Michele Hager  To: Kasi Santana MD  Sent: 3/20/2023 3:01 PM CDT  Subject: Ct scan test result     Hi, I had a CT scan last week ordered to by my surgeon. The results are on [place]. Dr. Brandt Herman has been monitoring my thyroid and parathyroid issues so I wanted to let her know that the results were in duly.  Thank you

## 2023-03-21 ENCOUNTER — TELEPHONE (OUTPATIENT)
Facility: CLINIC | Age: 43
End: 2023-03-21

## 2023-03-21 NOTE — TELEPHONE ENCOUNTER
Dr. Gennaro Bosch reviewed 4D-CT report. \"Sounds like they aren't able to see any distinct parathyroid adenoma. Will wait to see what Dr. Valentina Capone prefers to do from a surgical standpoint. \"    [email or web address] message sent to patient reviewing this. Will close this encounter.

## 2023-03-21 NOTE — TELEPHONE ENCOUNTER
3/21/23-Received via fax from Ailyn Pak and [facility] CT scan results. Given to MD to review. Placed in MD in basket.

## 2023-03-25 ENCOUNTER — TELEPHONE (OUTPATIENT)
Dept: INTERNAL MEDICINE CLINIC | Facility: CLINIC | Age: 43
End: 2023-03-25

## 2023-03-25 NOTE — TELEPHONE ENCOUNTER
Pt scheduled a follow up on MyChat as a VV. Ok to keep or would you like to see pt in the office ?     Future Appointments   Date Time Provider Sergo Moni   8/2/2023 10:00 AM Sloane Malhotra MD [facility] [place]

## 2023-05-15 ENCOUNTER — LAB ENCOUNTER (OUTPATIENT)
Dept: LAB | Age: 43
End: 2023-05-15
Attending: FAMILY MEDICINE
Payer: COMMERCIAL

## 2023-05-15 DIAGNOSIS — R79.89 ELEVATED TSH: ICD-10-CM

## 2023-05-15 DIAGNOSIS — E03.9 HYPOTHYROIDISM, UNSPECIFIED TYPE: ICD-10-CM

## 2023-05-15 LAB
T4 FREE SERPL-MCNC: 1.2 NG/DL (ref 0.8–1.7)
THYROGLOB SERPL-MCNC: 158 U/ML (ref ?–60)
THYROPEROXIDASE AB SERPL-ACNC: >1300 U/ML (ref ?–60)
TSI SER-ACNC: 1.73 MIU/ML (ref 0.36–3.74)

## 2023-05-15 PROCEDURE — 86800 THYROGLOBULIN ANTIBODY: CPT | Performed by: INTERNAL MEDICINE

## 2023-05-15 PROCEDURE — 84439 ASSAY OF FREE THYROXINE: CPT | Performed by: STUDENT IN AN ORGANIZED HEALTH CARE EDUCATION/TRAINING PROGRAM

## 2023-05-15 PROCEDURE — 86376 MICROSOMAL ANTIBODY EACH: CPT | Performed by: INTERNAL MEDICINE

## 2023-05-15 PROCEDURE — 84443 ASSAY THYROID STIM HORMONE: CPT | Performed by: STUDENT IN AN ORGANIZED HEALTH CARE EDUCATION/TRAINING PROGRAM

## 2023-06-07 DIAGNOSIS — E03.9 HYPOTHYROIDISM, UNSPECIFIED TYPE: ICD-10-CM

## 2023-06-07 RX ORDER — LEVOTHYROXINE SODIUM 0.05 MG/1
TABLET ORAL
Qty: 90 TABLET | Refills: 0 | Status: SHIPPED | OUTPATIENT
Start: 2023-06-07

## 2023-06-27 ENCOUNTER — HOSPITAL ENCOUNTER (OUTPATIENT)
Facility: HOSPITAL | Age: 43
Setting detail: HOSPITAL OUTPATIENT SURGERY
Discharge: HOME | End: 2023-06-27
Attending: SURGERY | Admitting: SURGERY
Payer: COMMERCIAL

## 2023-06-27 ENCOUNTER — ANESTHESIA EVENT (OUTPATIENT)
Dept: SURGERY | Facility: HOSPITAL | Age: 43
End: 2023-06-27
Payer: COMMERCIAL

## 2023-06-27 ENCOUNTER — ANESTHESIA (OUTPATIENT)
Dept: SURGERY | Facility: HOSPITAL | Age: 43
End: 2023-06-27
Payer: COMMERCIAL

## 2023-06-27 VITALS
BODY MASS INDEX: 25.57 KG/M2 | RESPIRATION RATE: 16 BRPM | WEIGHT: 162.94 LBS | SYSTOLIC BLOOD PRESSURE: 132 MMHG | HEART RATE: 61 BPM | TEMPERATURE: 97 F | DIASTOLIC BLOOD PRESSURE: 77 MMHG | HEIGHT: 67 IN | OXYGEN SATURATION: 98 %

## 2023-06-27 LAB
B-HCG UR QL: NEGATIVE
PTH-INTACT SERPL-MCNC: 348.5 PG/ML
PTH-INTACT SERPL-MCNC: 51.3 PG/ML

## 2023-06-27 PROCEDURE — 88305 TISSUE EXAM BY PATHOLOGIST: CPT | Performed by: SURGERY

## 2023-06-27 PROCEDURE — 83970 ASSAY OF PARATHORMONE: CPT | Performed by: SURGERY

## 2023-06-27 PROCEDURE — 81025 URINE PREGNANCY TEST: CPT

## 2023-06-27 PROCEDURE — 88331 PATH CONSLTJ SURG 1 BLK 1SPC: CPT | Performed by: SURGERY

## 2023-06-27 PROCEDURE — 0GBM0ZZ EXCISION OF LEFT SUPERIOR PARATHYROID GLAND, OPEN APPROACH: ICD-10-PCS | Performed by: SURGERY

## 2023-06-27 RX ORDER — SODIUM CHLORIDE, SODIUM LACTATE, POTASSIUM CHLORIDE, CALCIUM CHLORIDE 600; 310; 30; 20 MG/100ML; MG/100ML; MG/100ML; MG/100ML
INJECTION, SOLUTION INTRAVENOUS CONTINUOUS
Status: DISCONTINUED | OUTPATIENT
Start: 2023-06-27 | End: 2023-06-27

## 2023-06-27 RX ORDER — HYDROMORPHONE HYDROCHLORIDE 1 MG/ML
0.4 INJECTION, SOLUTION INTRAMUSCULAR; INTRAVENOUS; SUBCUTANEOUS EVERY 5 MIN PRN
Status: DISCONTINUED | OUTPATIENT
Start: 2023-06-27 | End: 2023-06-27

## 2023-06-27 RX ORDER — IBUPROFEN 600 MG/1
600 TABLET ORAL ONCE AS NEEDED
Status: DISCONTINUED | OUTPATIENT
Start: 2023-06-27 | End: 2023-06-27

## 2023-06-27 RX ORDER — IBUPROFEN 800 MG/1
800 TABLET ORAL EVERY 8 HOURS PRN
Qty: 20 TABLET | Refills: 1 | Status: SHIPPED | OUTPATIENT
Start: 2023-06-27 | End: 2023-08-26

## 2023-06-27 RX ORDER — LIDOCAINE HYDROCHLORIDE 10 MG/ML
INJECTION, SOLUTION EPIDURAL; INFILTRATION; INTRACAUDAL; PERINEURAL AS NEEDED
Status: DISCONTINUED | OUTPATIENT
Start: 2023-06-27 | End: 2023-06-27 | Stop reason: SURG

## 2023-06-27 RX ORDER — NEOSTIGMINE METHYLSULFATE 1 MG/ML
INJECTION, SOLUTION INTRAVENOUS AS NEEDED
Status: DISCONTINUED | OUTPATIENT
Start: 2023-06-27 | End: 2023-06-27 | Stop reason: SURG

## 2023-06-27 RX ORDER — GLYCOPYRROLATE 0.2 MG/ML
INJECTION, SOLUTION INTRAMUSCULAR; INTRAVENOUS AS NEEDED
Status: DISCONTINUED | OUTPATIENT
Start: 2023-06-27 | End: 2023-06-27 | Stop reason: SURG

## 2023-06-27 RX ORDER — ONDANSETRON 2 MG/ML
4 INJECTION INTRAMUSCULAR; INTRAVENOUS EVERY 6 HOURS PRN
Status: DISCONTINUED | OUTPATIENT
Start: 2023-06-27 | End: 2023-06-27

## 2023-06-27 RX ORDER — MIDAZOLAM HYDROCHLORIDE 1 MG/ML
1 INJECTION INTRAMUSCULAR; INTRAVENOUS EVERY 5 MIN PRN
Status: DISCONTINUED | OUTPATIENT
Start: 2023-06-27 | End: 2023-06-27

## 2023-06-27 RX ORDER — HYDROCODONE BITARTRATE AND ACETAMINOPHEN 5; 325 MG/1; MG/1
2 TABLET ORAL ONCE AS NEEDED
Status: COMPLETED | OUTPATIENT
Start: 2023-06-27 | End: 2023-06-27

## 2023-06-27 RX ORDER — HYDROMORPHONE HYDROCHLORIDE 1 MG/ML
0.2 INJECTION, SOLUTION INTRAMUSCULAR; INTRAVENOUS; SUBCUTANEOUS EVERY 5 MIN PRN
Status: DISCONTINUED | OUTPATIENT
Start: 2023-06-27 | End: 2023-06-27

## 2023-06-27 RX ORDER — PROCHLORPERAZINE EDISYLATE 5 MG/ML
5 INJECTION INTRAMUSCULAR; INTRAVENOUS EVERY 8 HOURS PRN
Status: DISCONTINUED | OUTPATIENT
Start: 2023-06-27 | End: 2023-06-27

## 2023-06-27 RX ORDER — HYDROMORPHONE HYDROCHLORIDE 1 MG/ML
0.6 INJECTION, SOLUTION INTRAMUSCULAR; INTRAVENOUS; SUBCUTANEOUS EVERY 5 MIN PRN
Status: DISCONTINUED | OUTPATIENT
Start: 2023-06-27 | End: 2023-06-27

## 2023-06-27 RX ORDER — ROCURONIUM BROMIDE 10 MG/ML
INJECTION, SOLUTION INTRAVENOUS AS NEEDED
Status: DISCONTINUED | OUTPATIENT
Start: 2023-06-27 | End: 2023-06-27 | Stop reason: SURG

## 2023-06-27 RX ORDER — MIDAZOLAM HYDROCHLORIDE 1 MG/ML
INJECTION INTRAMUSCULAR; INTRAVENOUS AS NEEDED
Status: DISCONTINUED | OUTPATIENT
Start: 2023-06-27 | End: 2023-06-27 | Stop reason: SURG

## 2023-06-27 RX ORDER — HYDROMORPHONE HYDROCHLORIDE 1 MG/ML
INJECTION, SOLUTION INTRAMUSCULAR; INTRAVENOUS; SUBCUTANEOUS
Status: COMPLETED
Start: 2023-06-27 | End: 2023-06-27

## 2023-06-27 RX ORDER — MIDAZOLAM HYDROCHLORIDE 1 MG/ML
INJECTION INTRAMUSCULAR; INTRAVENOUS
Status: COMPLETED
Start: 2023-06-27 | End: 2023-06-27

## 2023-06-27 RX ORDER — ONDANSETRON 2 MG/ML
INJECTION INTRAMUSCULAR; INTRAVENOUS AS NEEDED
Status: DISCONTINUED | OUTPATIENT
Start: 2023-06-27 | End: 2023-06-27 | Stop reason: SURG

## 2023-06-27 RX ORDER — ACETAMINOPHEN 500 MG
1000 TABLET ORAL ONCE
Status: DISCONTINUED | OUTPATIENT
Start: 2023-06-27 | End: 2023-06-27 | Stop reason: HOSPADM

## 2023-06-27 RX ORDER — DEXAMETHASONE SODIUM PHOSPHATE 4 MG/ML
VIAL (ML) INJECTION AS NEEDED
Status: DISCONTINUED | OUTPATIENT
Start: 2023-06-27 | End: 2023-06-27 | Stop reason: SURG

## 2023-06-27 RX ORDER — HYDROMORPHONE HYDROCHLORIDE 1 MG/ML
INJECTION, SOLUTION INTRAMUSCULAR; INTRAVENOUS; SUBCUTANEOUS
Status: DISCONTINUED
Start: 2023-06-27 | End: 2023-06-27 | Stop reason: WASHOUT

## 2023-06-27 RX ORDER — SCOLOPAMINE TRANSDERMAL SYSTEM 1 MG/1
1 PATCH, EXTENDED RELEASE TRANSDERMAL ONCE
Status: DISCONTINUED | OUTPATIENT
Start: 2023-06-27 | End: 2023-06-27 | Stop reason: HOSPADM

## 2023-06-27 RX ORDER — NALOXONE HYDROCHLORIDE 0.4 MG/ML
80 INJECTION, SOLUTION INTRAMUSCULAR; INTRAVENOUS; SUBCUTANEOUS AS NEEDED
Status: DISCONTINUED | OUTPATIENT
Start: 2023-06-27 | End: 2023-06-27

## 2023-06-27 RX ORDER — ACETAMINOPHEN 500 MG
1000 TABLET ORAL ONCE AS NEEDED
Status: COMPLETED | OUTPATIENT
Start: 2023-06-27 | End: 2023-06-27

## 2023-06-27 RX ORDER — BUPIVACAINE HYDROCHLORIDE 5 MG/ML
INJECTION, SOLUTION EPIDURAL; INTRACAUDAL AS NEEDED
Status: DISCONTINUED | OUTPATIENT
Start: 2023-06-27 | End: 2023-06-27 | Stop reason: HOSPADM

## 2023-06-27 RX ORDER — HYDROCODONE BITARTRATE AND ACETAMINOPHEN 5; 325 MG/1; MG/1
1 TABLET ORAL ONCE AS NEEDED
Status: COMPLETED | OUTPATIENT
Start: 2023-06-27 | End: 2023-06-27

## 2023-06-27 RX ADMIN — ONDANSETRON 4 MG: 2 INJECTION INTRAMUSCULAR; INTRAVENOUS at 11:30:00

## 2023-06-27 RX ADMIN — LIDOCAINE HYDROCHLORIDE 25 MG: 10 INJECTION, SOLUTION EPIDURAL; INFILTRATION; INTRACAUDAL; PERINEURAL at 11:12:00

## 2023-06-27 RX ADMIN — SODIUM CHLORIDE, SODIUM LACTATE, POTASSIUM CHLORIDE, CALCIUM CHLORIDE: 600; 310; 30; 20 INJECTION, SOLUTION INTRAVENOUS at 11:31:00

## 2023-06-27 RX ADMIN — NEOSTIGMINE METHYLSULFATE 2 MG: 1 INJECTION, SOLUTION INTRAVENOUS at 11:31:00

## 2023-06-27 RX ADMIN — SODIUM CHLORIDE, SODIUM LACTATE, POTASSIUM CHLORIDE, CALCIUM CHLORIDE: 600; 310; 30; 20 INJECTION, SOLUTION INTRAVENOUS at 12:55:00

## 2023-06-27 RX ADMIN — MIDAZOLAM HYDROCHLORIDE 2 MG: 1 INJECTION INTRAMUSCULAR; INTRAVENOUS at 11:11:00

## 2023-06-27 RX ADMIN — GLYCOPYRROLATE 0.2 MG: 0.2 INJECTION, SOLUTION INTRAMUSCULAR; INTRAVENOUS at 11:31:00

## 2023-06-27 RX ADMIN — ROCURONIUM BROMIDE 10 MG: 10 INJECTION, SOLUTION INTRAVENOUS at 11:13:00

## 2023-06-27 RX ADMIN — DEXAMETHASONE SODIUM PHOSPHATE 4 MG: 4 MG/ML VIAL (ML) INJECTION at 11:30:00

## 2023-07-13 ENCOUNTER — TELEMEDICINE (OUTPATIENT)
Facility: CLINIC | Age: 43
End: 2023-07-13
Payer: COMMERCIAL

## 2023-07-13 DIAGNOSIS — E21.3 HYPERPARATHYROIDISM (HCC): ICD-10-CM

## 2023-07-13 DIAGNOSIS — E06.3 HASHIMOTO'S THYROIDITIS: Primary | ICD-10-CM

## 2023-07-13 PROCEDURE — 99214 OFFICE O/P EST MOD 30 MIN: CPT | Performed by: STUDENT IN AN ORGANIZED HEALTH CARE EDUCATION/TRAINING PROGRAM

## 2023-07-13 NOTE — PROGRESS NOTES
Endocrinology Clinic Telemedicine Note    Name: Anthony Whiteheda    Date: 7/13/2023      Earl Ansari is a 37year old female who presents for  post-parathyroidectomy hyperCa. Initial HPI in May 2022  HyperCa/hyperPTH hx:  Review of Ca since 2016: 10.4 --> 11.0  Review of PTH since 2021: 88.6 --> 38.8    Parathyroidecomty hx:  Due to hx of kidney stones (pt thinks she's had at least 5), her PCP ordered Ca, then PTH, both elevated. No FHx of hyperCa. Never had severe abdominal pain, constipation, AMS. Saw gen surg Dr. Javi Johnson for consultation. Reviewed labs, and pt had endorsed hx of kidney stones, fatigue, concentration issues, and frequent urination. 24hr urine Ca not elevated. US performed by surgeon showed L posterior gland [id number]. No pre-op CT neck or sestamibi. Plan was to proceed with parathyroidectomy with IOUS  Op note on 3/15/22 showed R superior and L inferior parathyroids showed cellular tissue. L inferior gland had initial .8, peaked at 1078.9, dropped to 38.8 after removal    Pt is now 2 months post op.  2/2022: PTH 54.2, Ca 11.0  Had surgery f/u as well. Was advised to f/u labs again. Still has sx of fatigue. Is not having frequent urination again. Interim hx:  March 2023 5/2022 - elevated 24hr urine Ca 412 (2.4L collected), Ca 10.5, iCa 1.46, PTH 91.4, phos 3, vit D 41  5/2022 DXA lumbar -0.5, hip -1.7, fem neck -1.8  2/2023 - Ca 10.2, Cr 0.64, vit D 26.2, TSH 4.07, fT4 0.8, .8  Still feels tired and difficulty focusing and weight gain  Grandmother may have had thyroid disease.  Would like to try LT4  Saw Dr Javi Johnson this morning for f/u, is going to get imaging and likely get repeat surgery    July 2023 5/2023 - TSH 1.73, fT4 1.2, +Tg ab, +TPO ab -- LT4 50mcg  Pt went back to see Dr Javi Johnson   6/27/2023 - underwent parathyroidectomy with IOUS; L superior gland (hypercellular) removed with 85% drop in PTH (348 --> 51.3)  Did not need Ca supplements afterwards  Tolerating LT4. Has noticed 20# weight gain this year. PAST MEDICAL HISTORY:   Past Medical History:   Diagnosis Date    Anxiety state, unspecified     Calculus of kidney     10/2021    Disorder of thyroid     Endometriosis 04/2020    Herpes simplex of female genitalia     Kidney stones        PAST SURGICAL HISTORY:   Past Surgical History:   Procedure Laterality Date    LEEP      OTHER      tummy tuck, 2018 and 2019    OTHER SURGICAL HISTORY  03/15/2022    Parathyroidectomy with intraoperative ultrasound, intact PTH assay, intraoperative frozen section, and bilateral neuromonitoring    OTHER SURGICAL HISTORY  2022    lasik       CURRENT MEDICATIONS:    Current Outpatient Medications   Medication Sig Dispense Refill    ibuprofen 800 MG Oral Tab Take 1 tablet (800 mg total) by mouth every 8 (eight) hours as needed. For 2 weeks 20 tablet 1    Norethin-Eth Estrad-Fe Biphas (LO LOESTRIN FE OR) Take by mouth daily. LEVOTHYROXINE 50 MCG Oral Tab TAKE 1 TABLET(50 MCG) BY MOUTH BEFORE BREAKFAST 90 tablet 0    ALPRAZolam 0.25 MG Oral Tab Take 1 tablet (0.25 mg total) by mouth 2 (two) times daily as needed. As needed.  30 tablet 5     Endocrine Medications            LEVOTHYROXINE 50 MCG Oral Tab            ALLERGIES:  No Known Allergies    SOCIAL HISTORY:    Social History    Socioeconomic History      Marital status: Single    Tobacco Use      Smoking status: Never      Smokeless tobacco: Never    Vaping Use      Vaping Use: Never used    Substance and Sexual Activity      Alcohol use: Yes        Comment: Social       Drug use: No    Other Topics      Concerns:        Caffeine Concern: Yes          coffee 2 cups daily        Exercise: No      FAMILY HISTORY:   Family History   Problem Relation Age of Onset    Other (Other) Father         diverticulitis/gout    Breast Cancer Maternal Grandmother 61    Colon Cancer Other 48         REVIEW OF SYSTEMS:  Ten point review of systems has been performed and is otherwise negative and/or non-contributory, except as described above. PHYSICAL EXAM:   There were no vitals filed for this visit. BMI: There is no height or weight on file to calculate BMI. CONSTITUTIONAL:  awake, alert, cooperative, no apparent distress, and appears stated age  PSYCH: normal affect    DATA:     Final Diagnosis:   A. Right superior parathyroid, excision:  -Cellular parathyroid gland (0.1 grams). ovoid fragment of yellow-tan soft tissue measuring 0.6 cm in greatest dimension and weighing 0.1 grams  B. Left inferior parathyroid, excision:  -Cellular parathyroid gland (0.1 grams). -Adjacent thyroid tissue with chronic inflammation. ovoid fragment of red-tan soft tissue measuring 0.8 cm in greatest dimension and weighing 0.1 grams. Electronically signed by Tim Dawson MD on 3/17/2022       ASSESSMENT AND PLAN:    (E06.3) Hashimoto's thyroiditis  (primary encounter diagnosis)  Plan: ASSAY, THYROID STIM HORMONE, TRIIODOTHYRONINE         (T3) TOTAL, FREE T4 (FREE THYROXINE)  Biochemically at goal. ++TPO and Tg ab  - continue LT4 50mcg  - TFTs q6 months    (E21.3) Hyperparathyroidism ([facility] [address]  Plan: PTH, INTACT, VITAMIN D, CALCIUM, PTH, INTACT,         CALCIUM, VITAMIN D  Pt s/p right superior and left inferior parathyroid excision in March 2022 for primary hyperPTH, with refractory PTH and Ca. Then s/p L superior parathyroidectomy in June 2023 with 85% decrease in PTH. Feels well post-op, pending surgical f/u.  - f/u with endo surgeron  - f/u post-op PTH, Ca, vit D next month and again prior to next appt      RTC 5 months    A total of 30 minutes was spent today on obtaining history, reviewing pertinent labs, evaluating patient, providing multiple treatment options, reinforcing diet/exercise and compliance, and completing documentation.        7/13/2023    Zach Marrero MD

## 2023-07-19 ENCOUNTER — PATIENT MESSAGE (OUTPATIENT)
Dept: INTERNAL MEDICINE CLINIC | Facility: CLINIC | Age: 43
End: 2023-07-19

## 2023-07-19 DIAGNOSIS — E66.9 OBESITY, UNSPECIFIED CLASSIFICATION, UNSPECIFIED OBESITY TYPE, UNSPECIFIED WHETHER SERIOUS COMORBIDITY PRESENT: Primary | ICD-10-CM

## 2023-07-19 DIAGNOSIS — D51.9 ANEMIA DUE TO VITAMIN B12 DEFICIENCY, UNSPECIFIED B12 DEFICIENCY TYPE: ICD-10-CM

## 2023-07-19 NOTE — TELEPHONE ENCOUNTER
From: Magali Jimenez  To: Raf Shelby MD  Sent: 7/19/2023 8:18 AM CDT  Subject: Lab tests    Hi, my weight loss clinic requested some tests for me to get done. Can you put an order in? They said to just ask my primary doctor to do this.

## 2023-08-02 ENCOUNTER — TELEMEDICINE (OUTPATIENT)
Dept: INTERNAL MEDICINE CLINIC | Facility: CLINIC | Age: 43
End: 2023-08-02

## 2023-08-02 DIAGNOSIS — R03.0 ELEVATED BLOOD PRESSURE READING: Primary | ICD-10-CM

## 2023-08-02 PROCEDURE — 99213 OFFICE O/P EST LOW 20 MIN: CPT | Performed by: INTERNAL MEDICINE

## 2023-08-02 NOTE — PATIENT INSTRUCTIONS
Measure blood pressure while seated with both feet on the floor and arm supported, such as at the kitchen table. Sit quietly for a few minutes prior to checking. Decrease salt in the diet. Exercise goals for all adults is 150 minutes a week, or 20-30 min a day on most days of the week. Bring cuff to next appointment. Bring in log of blood pressure readings to next appointment. Goal is less than 140/90. Bring in the cuff to next appointment.

## 2023-08-02 NOTE — PROGRESS NOTES
This is a telemedicine visit with live, interactive video and audio. Patient understands and accepts financial responsibility for any deductible, co-insurance and/or co-pays associated with this service. SUBJECTIVE  High blood pressure readings   She has been in 063E systolic at home  She had a high office reading yesterday 979 systolic -> improved to 376 on recheck    Medication change of different OCP a few months ago. She notes it is working much better for her endometriosis,  previously on nuvaring. She has not taken antihypertensives in the past     HISTORY:  Past Medical History:   Diagnosis Date    Anxiety state, unspecified     Calculus of kidney     10/2021    Disorder of thyroid     Endometriosis 04/2020    Herpes simplex of female genitalia     Kidney stones       Past Surgical History:   Procedure Laterality Date    LEEP      OTHER      tummy tuck, 2018 and 2019    OTHER SURGICAL HISTORY  03/15/2022    Parathyroidectomy with intraoperative ultrasound, intact PTH assay, intraoperative frozen section, and bilateral neuromonitoring    OTHER SURGICAL HISTORY  2022    lasik      Family History   Problem Relation Age of Onset    Other (Other) Father         diverticulitis/gout    High Blood Pressure Father     Breast Cancer Maternal Grandmother 60    High Blood Pressure Paternal Grandmother     Colon Cancer Other 48      Social History     Socioeconomic History    Marital status: Single   Tobacco Use    Smoking status: Never    Smokeless tobacco: Never   Vaping Use    Vaping Use: Never used   Substance and Sexual Activity    Alcohol use: Yes     Comment: Social     Drug use: No   Other Topics Concern    Caffeine Concern Yes     Comment: coffee 2 cups daily    Exercise No        No Known Allergies   Current Outpatient Medications   Medication Sig Dispense Refill    ibuprofen 800 MG Oral Tab Take 1 tablet (800 mg total) by mouth every 8 (eight) hours as needed.  For 2 weeks 20 tablet 1    Norethin-Eth Estrad-Fe Biphas (LO LOESTRIN FE OR) Take by mouth daily. LEVOTHYROXINE 50 MCG Oral Tab TAKE 1 TABLET(50 MCG) BY MOUTH BEFORE BREAKFAST 90 tablet 0    ALPRAZolam 0.25 MG Oral Tab Take 1 tablet (0.25 mg total) by mouth 2 (two) times daily as needed. As needed. 30 tablet 5       OBJECTIVE  Physical Exam:   No acute distress. Alert and oriented. No increased work of breathing. ASSESSMENT & PLAN  Diagnoses and all orders for this visit:    Elevated blood pressure reading - discussed monitoring BP at home and bring log of BP readings to next appointment. We discussed the possibility that OCP may be contributing to high BP, but she does not want to make any changes as this OCP is better controlling her endometriosis symptoms. Measure blood pressure while seated with both feet on the floor and arm supported, such as at the kitchen table. Sit quietly for a few minutes prior to checking. Decrease salt in the diet. Exercise goals for all adults is 150 minutes a week, or 20-30 min a day on most days of the week. Bring cuff to next appointment.       Jailene Ervin MD

## 2023-08-23 ENCOUNTER — PATIENT MESSAGE (OUTPATIENT)
Facility: CLINIC | Age: 43
End: 2023-08-23

## 2023-08-23 NOTE — TELEPHONE ENCOUNTER
From: Hao Mota  To: Zach Marrero MD  Sent: 8/23/2023 4:17 PM CDT  Subject: Kidney stones after parathyroidectomy     Hi, I have Kidney stones again. I passed one yesterday and am in pain today still. My question is, is this normal since I had the parathyroidectomy twice now? Is there some thing I can do for this or is there a medication that I can take to break them up in case there are more? Please advice. Thank you!

## 2023-08-24 NOTE — TELEPHONE ENCOUNTER
[facility] sent to patient with Dr. Daria Bowman note: Repeat PTH/ Ca and vit D now- if all in normal range will need a stone analysis (If its not a calcium stone, urology may manage differently.

## 2023-08-25 ENCOUNTER — LAB ENCOUNTER (OUTPATIENT)
Dept: LAB | Age: 43
End: 2023-08-25
Attending: STUDENT IN AN ORGANIZED HEALTH CARE EDUCATION/TRAINING PROGRAM
Payer: COMMERCIAL

## 2023-08-25 DIAGNOSIS — E21.3 HYPERPARATHYROIDISM (HCC): ICD-10-CM

## 2023-08-25 LAB
CALCIUM BLD-MCNC: 9.2 MG/DL (ref 8.5–10.1)
PTH-INTACT SERPL-MCNC: 47 PG/ML (ref 18.5–88)
VIT D+METAB SERPL-MCNC: 54.2 NG/ML (ref 30–100)

## 2023-08-25 PROCEDURE — 82306 VITAMIN D 25 HYDROXY: CPT | Performed by: STUDENT IN AN ORGANIZED HEALTH CARE EDUCATION/TRAINING PROGRAM

## 2023-08-25 PROCEDURE — 83970 ASSAY OF PARATHORMONE: CPT | Performed by: STUDENT IN AN ORGANIZED HEALTH CARE EDUCATION/TRAINING PROGRAM

## 2023-08-25 PROCEDURE — 82310 ASSAY OF CALCIUM: CPT | Performed by: STUDENT IN AN ORGANIZED HEALTH CARE EDUCATION/TRAINING PROGRAM

## 2023-09-05 DIAGNOSIS — E03.9 HYPOTHYROIDISM, UNSPECIFIED TYPE: ICD-10-CM

## 2023-09-05 NOTE — TELEPHONE ENCOUNTER
LOV:    RTC: 5 months     FU:    Last Refill:     Month Supply Pendin days    LOV note: Biochemically at goal. ++TPO and Tg ab  - continue LT4 50mcg  - TFTs q6 months     Component      Latest Ref Rng 5/15/2023   TSH      0.358 - 3.740 mIU/mL 1.730    T4,Free (Direct)      0.8 - 1.7 ng/dL 1.2      Lab note: Tests show no significant abnormalities. We will continue on the same dose of levothyroxine. Take care! RX pended and rounted to Dr. Deborah Lopez for review.

## 2023-09-06 RX ORDER — LEVOTHYROXINE SODIUM 0.05 MG/1
50 TABLET ORAL
Qty: 90 TABLET | Refills: 0 | Status: SHIPPED | OUTPATIENT
Start: 2023-09-06

## 2023-09-13 DIAGNOSIS — F41.9 ANXIETY: ICD-10-CM

## 2023-09-14 DIAGNOSIS — F41.9 ANXIETY: ICD-10-CM

## 2023-09-14 RX ORDER — ALPRAZOLAM 0.25 MG/1
0.25 TABLET ORAL 2 TIMES DAILY PRN
Qty: 30 TABLET | Refills: 3 | Status: SHIPPED | OUTPATIENT
Start: 2023-09-14

## 2023-09-15 RX ORDER — ALPRAZOLAM 0.25 MG/1
0.25 TABLET ORAL 2 TIMES DAILY PRN
Qty: 30 TABLET | Refills: 0 | OUTPATIENT
Start: 2023-09-15

## 2023-09-20 ENCOUNTER — OFFICE VISIT (OUTPATIENT)
Dept: INTERNAL MEDICINE CLINIC | Facility: CLINIC | Age: 43
End: 2023-09-20
Payer: COMMERCIAL

## 2023-09-20 VITALS
SYSTOLIC BLOOD PRESSURE: 134 MMHG | HEART RATE: 77 BPM | DIASTOLIC BLOOD PRESSURE: 90 MMHG | WEIGHT: 165.81 LBS | TEMPERATURE: 98 F | BODY MASS INDEX: 26.02 KG/M2 | RESPIRATION RATE: 16 BRPM | OXYGEN SATURATION: 97 % | HEIGHT: 67 IN

## 2023-09-20 DIAGNOSIS — I10 ESSENTIAL HYPERTENSION: Primary | ICD-10-CM

## 2023-09-20 PROCEDURE — 3008F BODY MASS INDEX DOCD: CPT | Performed by: INTERNAL MEDICINE

## 2023-09-20 PROCEDURE — 3075F SYST BP GE 130 - 139MM HG: CPT | Performed by: INTERNAL MEDICINE

## 2023-09-20 PROCEDURE — 99213 OFFICE O/P EST LOW 20 MIN: CPT | Performed by: INTERNAL MEDICINE

## 2023-09-20 PROCEDURE — 3080F DIAST BP >= 90 MM HG: CPT | Performed by: INTERNAL MEDICINE

## 2023-09-20 RX ORDER — LISINOPRIL 2.5 MG/1
2.5 TABLET ORAL DAILY
Qty: 30 TABLET | Refills: 0 | Status: SHIPPED | OUTPATIENT
Start: 2023-09-20

## 2023-09-20 NOTE — PATIENT INSTRUCTIONS
[employer] blood pressure cuff     Start lisinopril 2.5mg once a day     Oct 4 2023 at 9:15am follow up appointment

## 2023-09-21 RX ORDER — LISINOPRIL 2.5 MG/1
2.5 TABLET ORAL DAILY
Qty: 90 TABLET | Refills: 0 | OUTPATIENT
Start: 2023-09-21

## 2023-09-21 NOTE — TELEPHONE ENCOUNTER
Filled already on 9-20-23    Lisinopril 2.5 mg  Hypertension Medications Protocol [id number]/2023 08:52 AM   Protocol Details CMP or BMP in past 12 months   Filled 9-20-23  Qty 30  0 refills

## 2023-10-04 ENCOUNTER — OFFICE VISIT (OUTPATIENT)
Dept: INTERNAL MEDICINE CLINIC | Facility: CLINIC | Age: 43
End: 2023-10-04
Payer: COMMERCIAL

## 2023-10-04 VITALS
OXYGEN SATURATION: 98 % | SYSTOLIC BLOOD PRESSURE: 124 MMHG | HEIGHT: 67 IN | DIASTOLIC BLOOD PRESSURE: 88 MMHG | HEART RATE: 78 BPM | TEMPERATURE: 98 F | WEIGHT: 166.63 LBS | BODY MASS INDEX: 26.15 KG/M2 | RESPIRATION RATE: 16 BRPM

## 2023-10-04 DIAGNOSIS — I10 ESSENTIAL HYPERTENSION: Primary | ICD-10-CM

## 2023-10-04 PROCEDURE — 3008F BODY MASS INDEX DOCD: CPT | Performed by: INTERNAL MEDICINE

## 2023-10-04 PROCEDURE — 3079F DIAST BP 80-89 MM HG: CPT | Performed by: INTERNAL MEDICINE

## 2023-10-04 PROCEDURE — 99213 OFFICE O/P EST LOW 20 MIN: CPT | Performed by: INTERNAL MEDICINE

## 2023-10-04 PROCEDURE — 3074F SYST BP LT 130 MM HG: CPT | Performed by: INTERNAL MEDICINE

## 2023-10-04 RX ORDER — LISINOPRIL 2.5 MG/1
2.5 TABLET ORAL DAILY
Qty: 90 TABLET | Refills: 0 | Status: SHIPPED | OUTPATIENT
Start: 2023-10-04

## 2023-10-04 NOTE — PATIENT INSTRUCTIONS
If blood pressure is persisting at 90 or above, then we will change to 5mg for lisinopril.  Send me readings in a few weeks via [employer] or call my nurse

## 2023-10-20 ENCOUNTER — PATIENT MESSAGE (OUTPATIENT)
Dept: INTERNAL MEDICINE CLINIC | Facility: CLINIC | Age: 43
End: 2023-10-20

## 2023-10-23 RX ORDER — LISINOPRIL 5 MG/1
5 TABLET ORAL DAILY
Qty: 90 TABLET | Refills: 1 | Status: SHIPPED | OUTPATIENT
Start: 2023-10-23

## 2023-10-23 NOTE — TELEPHONE ENCOUNTER
LS - is this information sufficient or would you like all of the specific blood pressure numbers? Please advise, ty!

## 2023-10-26 ENCOUNTER — PATIENT MESSAGE (OUTPATIENT)
Facility: CLINIC | Age: 43
End: 2023-10-26

## 2023-10-26 DIAGNOSIS — E03.9 HYPOTHYROIDISM, UNSPECIFIED TYPE: Primary | ICD-10-CM

## 2023-10-27 NOTE — TELEPHONE ENCOUNTER
[facility] routed to Dr. Gely Veras for review. Patient wondering if she can do labs early due to struggling with fatigue. Labs are in the system for PTH, Vit. D, Calcium and TFT panel.

## 2023-11-02 ENCOUNTER — LABORATORY ENCOUNTER (OUTPATIENT)
Dept: LAB | Age: 43
End: 2023-11-02
Attending: STUDENT IN AN ORGANIZED HEALTH CARE EDUCATION/TRAINING PROGRAM
Payer: COMMERCIAL

## 2023-11-02 DIAGNOSIS — E03.9 HYPOTHYROIDISM, UNSPECIFIED TYPE: ICD-10-CM

## 2023-11-02 LAB
T4 FREE SERPL-MCNC: 1.2 NG/DL (ref 0.8–1.7)
TSI SER-ACNC: 1.09 MIU/ML (ref 0.36–3.74)

## 2023-11-02 PROCEDURE — 84439 ASSAY OF FREE THYROXINE: CPT | Performed by: STUDENT IN AN ORGANIZED HEALTH CARE EDUCATION/TRAINING PROGRAM

## 2023-11-02 PROCEDURE — 84480 ASSAY TRIIODOTHYRONINE (T3): CPT | Performed by: STUDENT IN AN ORGANIZED HEALTH CARE EDUCATION/TRAINING PROGRAM

## 2023-11-02 PROCEDURE — 84443 ASSAY THYROID STIM HORMONE: CPT | Performed by: STUDENT IN AN ORGANIZED HEALTH CARE EDUCATION/TRAINING PROGRAM

## 2023-11-03 LAB — T3 SERPL-MCNC: 96 NG/DL (ref 60–181)

## 2023-11-06 ENCOUNTER — TELEPHONE (OUTPATIENT)
Facility: CLINIC | Age: 43
End: 2023-11-06

## 2023-11-06 DIAGNOSIS — E06.3 HASHIMOTO'S THYROIDITIS: Primary | ICD-10-CM

## 2023-11-06 NOTE — TELEPHONE ENCOUNTER
TE continued from result management note dated-11/6/23    Labs pended (Ca, Vit D, PTH); labs to be done a week prior to pt's 12/13/23 o.v. with Dr. Gely Veras.     Will route pended lab orders to Dr. Gely Veras for approval.

## 2023-12-05 ENCOUNTER — LAB ENCOUNTER (OUTPATIENT)
Dept: LAB | Age: 43
End: 2023-12-05
Attending: INTERNAL MEDICINE
Payer: COMMERCIAL

## 2023-12-05 DIAGNOSIS — E06.3 HASHIMOTO'S THYROIDITIS: ICD-10-CM

## 2023-12-05 DIAGNOSIS — E21.3 HYPERPARATHYROIDISM (HCC): ICD-10-CM

## 2023-12-05 LAB
CALCIUM BLD-MCNC: 9.7 MG/DL (ref 8.5–10.1)
PTH-INTACT SERPL-MCNC: 36.1 PG/ML (ref 18.5–88)
PTH-INTACT SERPL-MCNC: 39.5 PG/ML (ref 18.5–88)
T3 SERPL-MCNC: 118 NG/DL (ref 60–181)
T4 FREE SERPL-MCNC: 1.2 NG/DL (ref 0.8–1.7)
TSI SER-ACNC: 1.76 MIU/ML (ref 0.36–3.74)
VIT D+METAB SERPL-MCNC: 29.1 NG/ML (ref 30–100)
VIT D+METAB SERPL-MCNC: 31 NG/ML (ref 30–100)

## 2023-12-05 PROCEDURE — 84439 ASSAY OF FREE THYROXINE: CPT | Performed by: STUDENT IN AN ORGANIZED HEALTH CARE EDUCATION/TRAINING PROGRAM

## 2023-12-05 PROCEDURE — 82306 VITAMIN D 25 HYDROXY: CPT | Performed by: STUDENT IN AN ORGANIZED HEALTH CARE EDUCATION/TRAINING PROGRAM

## 2023-12-05 PROCEDURE — 83970 ASSAY OF PARATHORMONE: CPT | Performed by: STUDENT IN AN ORGANIZED HEALTH CARE EDUCATION/TRAINING PROGRAM

## 2023-12-05 PROCEDURE — 82310 ASSAY OF CALCIUM: CPT | Performed by: STUDENT IN AN ORGANIZED HEALTH CARE EDUCATION/TRAINING PROGRAM

## 2023-12-05 PROCEDURE — 84480 ASSAY TRIIODOTHYRONINE (T3): CPT | Performed by: STUDENT IN AN ORGANIZED HEALTH CARE EDUCATION/TRAINING PROGRAM

## 2023-12-05 PROCEDURE — 84443 ASSAY THYROID STIM HORMONE: CPT | Performed by: STUDENT IN AN ORGANIZED HEALTH CARE EDUCATION/TRAINING PROGRAM

## 2023-12-10 DIAGNOSIS — E03.9 HYPOTHYROIDISM, UNSPECIFIED TYPE: ICD-10-CM

## 2023-12-11 RX ORDER — LEVOTHYROXINE SODIUM 0.05 MG/1
50 TABLET ORAL
Qty: 90 TABLET | Refills: 0 | Status: SHIPPED | OUTPATIENT
Start: 2023-12-11

## 2023-12-11 NOTE — TELEPHONE ENCOUNTER
LOV: 7/13/23    RTC: 5 months     FU: scheduled 12/14/23 (this Thursday)     Last Refill: 9/6/23    Month Supply Pending: 3 months    Component      Latest Ref Rng 12/5/2023   TSH      0.358 - 3.740 mIU/mL 1.760    T4,Free (Direct)      0.8 - 1.7 ng/dL 1.2      Phoned patient to see if she has any remaining medication to last thru appointment on 12/14. Patient states she is out of medication completely, today being the first day without.

## 2023-12-14 ENCOUNTER — TELEMEDICINE (OUTPATIENT)
Facility: CLINIC | Age: 43
End: 2023-12-14
Payer: COMMERCIAL

## 2023-12-14 DIAGNOSIS — E06.3 HASHIMOTO'S THYROIDITIS: Primary | ICD-10-CM

## 2023-12-14 DIAGNOSIS — E03.9 HYPOTHYROIDISM, UNSPECIFIED TYPE: ICD-10-CM

## 2023-12-14 PROCEDURE — 99214 OFFICE O/P EST MOD 30 MIN: CPT | Performed by: STUDENT IN AN ORGANIZED HEALTH CARE EDUCATION/TRAINING PROGRAM

## 2023-12-14 NOTE — PROGRESS NOTES
Endocrinology Clinic Telemedicine Note    Name: Nayan Yi    Date: 12/14/2023    HISTORY OF PRESENT ILLNESS   Nayan Yi is a 37year old female who presents for  post-parathyroidectomy hyperCa. Initial HPI in May 2022  HyperCa/hyperPTH hx:  Review of Ca since 2016: 10.4 --> 11.0  Review of PTH since 2021: 88.6 --> 38.8    Parathyroidecomty hx:  Due to hx of kidney stones (pt thinks she's had at least 5), her PCP ordered Ca, then PTH, both elevated. No FHx of hyperCa. Never had severe abdominal pain, constipation, AMS. Saw gen surg Dr. Tika Zapata for consultation. Reviewed labs, and pt had endorsed hx of kidney stones, fatigue, concentration issues, and frequent urination. 24hr urine Ca not elevated. US performed by surgeon showed L posterior gland [id number]. No pre-op CT neck or sestamibi. Plan was to proceed with parathyroidectomy with IOUS  Op note on 3/15/22 showed R superior and L inferior parathyroids showed cellular tissue. L inferior gland had initial .8, peaked at 1078.9, dropped to 38.8 after removal    Pt is now 2 months post op.  2/2022: PTH 54.2, Ca 11.0  Had surgery f/u as well. Was advised to f/u labs again. Still has sx of fatigue. Is not having frequent urination again. Interim hx:  March 2023 5/2022 - elevated 24hr urine Ca 412 (2.4L collected), Ca 10.5, iCa 1.46, PTH 91.4, phos 3, vit D 41  5/2022 DXA lumbar -0.5, hip -1.7, fem neck -1.8  2/2023 - Ca 10.2, Cr 0.64, vit D 26.2, TSH 4.07, fT4 0.8, .8  Still feels tired and difficulty focusing and weight gain  Grandmother may have had thyroid disease.  Would like to try LT4  Saw Dr Tika Zapata this morning for f/u, is going to get imaging and likely get repeat surgery    July 2023 5/2023 - TSH 1.73, fT4 1.2, +Tg ab, +TPO ab -- LT4 50mcg  Pt went back to see Dr Tika Zapata   6/27/2023 - underwent parathyroidectomy with IOUS; L superior gland (hypercellular) removed with 85% drop in PTH (348 --> 51.3)  Did not need Ca supplements afterwards  Tolerating LT4. Has noticed 20# weight gain this year. Dec 2023  8/2023 - Ca 9.2, PTH 47  12/2023 - Ca 9.7, PTH 36 and 39, fT4 1.2, TSH 1.7, TT3 118 -- LT4 50mcg  Last recurrent kidney stone was in August. Not sure if residual from previous or new onset. She will see urology if it continues     PAST MEDICAL HISTORY:   Past Medical History:   Diagnosis Date    Anxiety state, unspecified     Calculus of kidney     10/2021    Disorder of thyroid     Endometriosis 04/2020    Herpes simplex of female genitalia     Kidney stones        PAST SURGICAL HISTORY:   Past Surgical History:   Procedure Laterality Date    LEEP      OTHER      tummy tuck, 2018 and 2019    OTHER SURGICAL HISTORY  03/15/2022    Parathyroidectomy with intraoperative ultrasound, intact PTH assay, intraoperative frozen section, and bilateral neuromonitoring    OTHER SURGICAL HISTORY  2022    lasik       CURRENT MEDICATIONS:    Current Outpatient Medications   Medication Sig Dispense Refill    levothyroxine 50 MCG Oral Tab Take 1 tablet (50 mcg total) by mouth before breakfast. 90 tablet 0    lisinopril 5 MG Oral Tab Take 1 tablet (5 mg total) by mouth daily. 90 tablet 1    Norethin-Eth Estrad-Fe Biphas (LO LOESTRIN FE OR) Take by mouth. ALPRAZolam 0.25 MG Oral Tab Take 1 tablet (0.25 mg total) by mouth 2 (two) times daily as needed.  30 tablet 3     Endocrine Medications            levothyroxine 50 MCG Oral Tab            ALLERGIES:  No Known Allergies    SOCIAL HISTORY:    Social History     Socioeconomic History    Marital status: Single   Tobacco Use    Smoking status: Never    Smokeless tobacco: Never   Vaping Use    Vaping Use: Never used   Substance and Sexual Activity    Alcohol use: Yes     Comment: Social     Drug use: No   Other Topics Concern    Caffeine Concern Yes     Comment: coffee 2 cups daily    Exercise No       FAMILY HISTORY:   Family History   Problem Relation Age of Onset    Other (Other) Father diverticulitis/gout    High Blood Pressure Father     Breast Cancer Maternal Grandmother 60    High Blood Pressure Paternal Grandmother     Colon Cancer Other 48         REVIEW OF SYSTEMS:  Ten point review of systems has been performed and is otherwise negative and/or non-contributory, except as described above. PHYSICAL EXAM:   There were no vitals filed for this visit. BMI: There is no height or weight on file to calculate BMI. CONSTITUTIONAL:  awake, alert, cooperative, no apparent distress, and appears stated age  PSYCH: normal affect    DATA:     Final Diagnosis:   A. Right superior parathyroid, excision:  -Cellular parathyroid gland (0.1 grams). ovoid fragment of yellow-tan soft tissue measuring 0.6 cm in greatest dimension and weighing 0.1 grams  B. Left inferior parathyroid, excision:  -Cellular parathyroid gland (0.1 grams). -Adjacent thyroid tissue with chronic inflammation. ovoid fragment of red-tan soft tissue measuring 0.8 cm in greatest dimension and weighing 0.1 grams. Electronically signed by Oumar Morley MD on 3/17/2022       ASSESSMENT AND PLAN:    (E06.3) Hashimoto's thyroiditis  (primary encounter diagnosis)  Plan:   Clinically and biochemically euthyroid. - continue LT4 50mcg  - TFTs q6 months  - has noticed more GI sensitivity; will r/o Celiac; also discussed anti-inflammatory diet    (E21.3) Hyperparathyroidism ([address]  Plan: PTH, INTACT, VITAMIN D, CALCIUM, PTH, INTACT,         CALCIUM, VITAMIN D  Pt s/p right superior and left inferior parathyroid excision in March 2022 for primary hyperPTH, with refractory PTH and Ca. Then s/p L superior parathyroidectomy in June 2023 with 85% decrease in PTH.  PTH/Ca remains stable 6 months post-op  - labs prior to next appointment       RTC 6 months      12/14/2023    Humera Good MD

## 2023-12-18 ENCOUNTER — LAB ENCOUNTER (OUTPATIENT)
Dept: LAB | Age: 43
End: 2023-12-18
Attending: STUDENT IN AN ORGANIZED HEALTH CARE EDUCATION/TRAINING PROGRAM
Payer: COMMERCIAL

## 2023-12-18 DIAGNOSIS — E06.3 HASHIMOTO'S THYROIDITIS: ICD-10-CM

## 2023-12-18 LAB — IGA SERPL-MCNC: 158 MG/DL (ref 70–312)

## 2023-12-18 PROCEDURE — 82784 ASSAY IGA/IGD/IGG/IGM EACH: CPT | Performed by: STUDENT IN AN ORGANIZED HEALTH CARE EDUCATION/TRAINING PROGRAM

## 2023-12-18 PROCEDURE — 86364 TISS TRNSGLTMNASE EA IG CLAS: CPT | Performed by: STUDENT IN AN ORGANIZED HEALTH CARE EDUCATION/TRAINING PROGRAM

## 2023-12-20 LAB — TTG IGA SER-ACNC: 0.4 U/ML (ref ?–7)

## 2024-01-12 DIAGNOSIS — F41.9 ANXIETY: ICD-10-CM

## 2024-01-15 RX ORDER — ALPRAZOLAM 0.25 MG/1
0.25 TABLET ORAL 2 TIMES DAILY PRN
Qty: 30 TABLET | Refills: 3 | Status: SHIPPED | OUTPATIENT
Start: 2024-01-15

## 2024-01-15 NOTE — TELEPHONE ENCOUNTER
No protocol   Alprazolam 0.25mg     LOV:10/4/23   RTC: 3 months  Filled:9/14/23 #30 3 refills   Labs:12/5/23   Future Appointments   Date Time Provider Department Center   6/5/2024  1:30 PM Angel Allen MD [facility]

## 2024-02-20 ENCOUNTER — OFFICE VISIT (OUTPATIENT)
Dept: SURGERY | Facility: CLINIC | Age: 44
End: 2024-02-20
Payer: COMMERCIAL

## 2024-02-20 DIAGNOSIS — N39.0 RECURRENT UTI: Primary | ICD-10-CM

## 2024-02-20 LAB
APPEARANCE: CLEAR
BILIRUBIN: NEGATIVE
GLUCOSE (URINE DIPSTICK): NEGATIVE MG/DL
KETONES (URINE DIPSTICK): NEGATIVE MG/DL
LEUKOCYTES: NEGATIVE
MULTISTIX LOT#: ABNORMAL NUMERIC
NITRITE, URINE: NEGATIVE
PH, URINE: 6.5 (ref 4.5–8)
SPECIFIC GRAVITY: 1.02 (ref 1–1.03)
URINE-COLOR: YELLOW
UROBILINOGEN,SEMI-QN: 0.2 MG/DL (ref 0–1.9)

## 2024-02-20 PROCEDURE — 99204 OFFICE O/P NEW MOD 45 MIN: CPT

## 2024-02-20 PROCEDURE — 81003 URINALYSIS AUTO W/O SCOPE: CPT

## 2024-02-20 NOTE — PROGRESS NOTES
[facility], [facility]    Urology Consult Note    History of Present Illness:   Patient is a(n) 44 year old female with hx of anxiety, endometriosis, hypothyroidism, and HTN who presents for persistent UTI consult.    Pt was dx with UTI 1/2024. Culture positive for enterococcus. Was treated with 5 days of macrobid but sx lingered and she was tx with another 10 days of macrobid. Culture was not done before second course of macrobid. Feels sx persisted upon completion of abx and has been taking azo for a few wks now prn.     Currently still feeling sx if she does not take azo. UA today trace intact blood, otherwise neg.     She gets approx 1x UTI per yr. Occasionally correlated with intercourse. Usually resolves with abx.     Drinks 30-40oz water a day, 10-20oz coffee, no soda, tea.     HISTORY:  Past Medical History:   Diagnosis Date    Anxiety state, unspecified     Calculus of kidney     10/2021    Disorder of thyroid     Endometriosis 04/2020    Herpes simplex of female genitalia     Kidney stones       Past Surgical History:   Procedure Laterality Date    LEEP      OTHER      tummy tuck, 2018 and 2019    OTHER SURGICAL HISTORY  03/15/2022    Parathyroidectomy with intraoperative ultrasound, intact PTH assay, intraoperative frozen section, and bilateral neuromonitoring    OTHER SURGICAL HISTORY  2022    lasik      Family History   Problem Relation Age of Onset    Other (Other) Father         diverticulitis/gout    High Blood Pressure Father     Breast Cancer Maternal Grandmother 60    High Blood Pressure Paternal Grandmother     Colon Cancer Other 50      Social History:   Social History     Socioeconomic History    Marital status: Single   Tobacco Use    Smoking status: Never    Smokeless tobacco: Never   Vaping Use    Vaping Use: Never used   Substance and Sexual Activity    Alcohol use: Yes     Comment: Social     Drug use: No   Other Topics Concern    Caffeine Concern Yes      Comment: coffee 2 cups daily    Exercise No        Allergies  No Known Allergies    Review of Systems:   A 10-point review of systems was completed and is negative other than as noted above.    Physical Exam:   LMP 03/05/2023 (Approximate)     GENERAL APPEARANCE: no acute distress  NEUROLOGIC: converses appropriately  HEAD: atraumatic, normocephalic  LUNGS: non-labored breathing  ABDOMEN: soft, nontender, non-distended  BACK: no CVA tenderness  PSYCH: appropriate affect and mood    Results:     Laboratory Data:  Lab Results   Component Value Date    WBC 5.5 02/20/2023    HGB 13.6 02/20/2023    .0 02/20/2023     Lab Results   Component Value Date     11/20/2021    K 4.6 11/20/2021     11/20/2021    CO2 23.0 11/20/2021    BUN 12 11/20/2021    GLU 89 11/20/2021    GFRAA 123 11/20/2021    AST 14 (L) 11/20/2021    ALT 28 11/20/2021    TP 7.2 11/20/2021    ALB 3.9 11/20/2021    PHOS 3.0 05/19/2022    CA 9.7 12/05/2023       Urinalysis Results (last 3 years):  Recent Labs     02/20/24  1306   SPECGRAVITY 1.025   PHURINE 6.5   NITRITE Negative       Urine Culture Results (last 3 years):  Lab Results   Component Value Date    URINECUL  01/20/2018     <10,000 cfu/ml Multiple species present- probable contamination.       Imaging  No results found.      Impression:   Recommendations:  Recurrent UTI  - PCR UTI STI to r/o persisting infxn  - if neg, then most likely bladder inflammation and should be drinking at least 40-60 oz water per day or enough to keep urine clear and to avoid dietary irritants such as coffee, tea, carbonated drinks, soda, juice, spicy, and citrus  - discussed behavioral modifications such as double voiding, bending over after void to completely empty, and post coital hygiene  - if pos, will tx and get upper tract imaging to r/o stones as source of infxn    A total of 30 minutes were spent on the visit including chart review in preparation for the visit, time with the patient, placing  orders and communication with other providers where appropriate.    Thank you very much for this consult. Please call if there are any questions or concerns.     Galina Melara PA-C  Urology  [facility]  Phone: [phone]    Date: 2/20/2024  Time: 3:07 PM

## 2024-02-20 NOTE — PATIENT INSTRUCTIONS
Dietary Irritants    What you can do to help relieve your symptoms:    The purpose of this handout is to provide information that can help you discover what you are ingesting or doing that may be contributing to your recurrent irritative voiding symptoms and what steps you can take to relieve your discomfort.     Frequency, urgency, and pain on urination are symptoms of inflammation or irritation. These symptoms can be caused by bacterial infections or substances in the urine causing irritation to the lining of the bladder or urethra. Bacterial infections can be treated with antibiotics. But irritation of the bladder or urethra can results from other cause that will probably not respond to antibiotics.    What to do at the first sign of bladder or urethral discomfort:    The basic treatment for irritable bladder symptoms, whether induced by bacterial or nonbacterial irritants, is hydration. At the first sign of discomfort, start drinking 16 oz of water mixed with 1 teaspoon of baking soda. Then drink 8 oz of plain water every 20 minutes for the next 2-3 hours.     Repeat drinking 16 oz of water with baking soda in 3-4 hours. (Baking soda contains sodium and can cause fluid retention. If you have a history of high blood pressure, congestive heart failure, or renal disease, you should not use more than twice in 24 hours.)    You can take acetaminophen to relieve the pain (two extra strength or three regular strength tablets). Bladder analgesics such as phenazopyridine (Pyridium) may help and will not alter the results of a urine culture should the symptoms not be significantly relieved by diluting the urine and flushing out the bladder.     A warm bath to help muscles of the pelvic floor relax will also help lessen discomfort.     Before starting any antibiotic, a urine culture must be taken. If the conservative measures mentioned above are not helping, call the office to arrange for a urine culture. If the specimen  is contaminated with vaginal cells and bacteria and you are severely symptomatic, then a catheterized specimen should be obtained directly from your bladder to determine precisely whether bacterial infection is the cause of your symptoms.     Dietary irritants to the urinary tract to avoid:    Certain food can contribute to urinary frequency, urgency, and discomfort. If bladder symptoms are related to dietary factors, strict adherence to a diet that eliminated the food should bring significant relief in 10 days. Once you are feeling better, you can begin to add foods back into your diet one at a time. If the symptoms return, you will be able to identify the irritant. As you add foods back to your diet it is very important that you drink significant amounts of water. These foods are acidic and are considered irritants to the bladder. They should be avoided.    Alcoholic beverages  Apples and apple juice  Cantaloupe  Carbonated beverages  Chili and spicy foods  Citrus fruit  Chocolate  Coffee (including decaf)  Cranberries and juice  Grapes  Guava  Peaches  Pineapple  Plums  Strawberries  Sugar*  Tea  Tomatoes  Vit B Complex  Vinegar  *Some women report that sugar flares their symptoms.    Low acid fruit substitutions include apricots, papaya, pears, and watermelon. Coffee drinkers can drink Kava or other low-acid instant drinks. Tea drinks can substitute non-citrus herbal and sun brewed teas. Calcium carbonate co-buffered with calcium ascorbate can be substituted for Vitamin C.

## 2024-02-22 ENCOUNTER — TELEPHONE (OUTPATIENT)
Dept: SURGERY | Facility: CLINIC | Age: 44
End: 2024-02-22

## 2024-02-22 ENCOUNTER — PATIENT MESSAGE (OUTPATIENT)
Dept: SURGERY | Facility: CLINIC | Age: 44
End: 2024-02-22

## 2024-02-22 RX ORDER — GRANULES FOR ORAL 3 G/1
3 POWDER ORAL ONCE
Qty: 3 G | Refills: 0 | Status: SHIPPED | OUTPATIENT
Start: 2024-02-22 | End: 2024-02-22

## 2024-02-22 NOTE — TELEPHONE ENCOUNTER
10-49k e. Coli, enteorococcus 10-49k, aerococcus <10k, amp, macrobid, fosfomycin sens, recently finished two courses of macrobid. Will send fosfomycin thsi time. If sx do not resolve, return to office. If resolves, can f/u prn.

## 2024-02-23 RX ORDER — NITROFURANTOIN 25; 75 MG/1; MG/1
100 CAPSULE ORAL 2 TIMES DAILY
Qty: 20 CAPSULE | Refills: 0 | Status: SHIPPED | OUTPATIENT
Start: 2024-02-23 | End: 2024-03-04

## 2024-02-23 NOTE — TELEPHONE ENCOUNTER
From: Bozena Holcomb  To: Galina Melara  Sent: 2/22/2024 8:05 PM CST  Subject: Prescription     Hi the rx that was prescribed to me, is not covered by my insurance. Is there a different one that can be ordered that’s similar and that would be covered? My pain is getting worse and I don’t think I can go another day without a prescription. Let me know I would appreciate it. Thank you!

## 2024-03-02 DIAGNOSIS — E03.9 HYPOTHYROIDISM, UNSPECIFIED TYPE: ICD-10-CM

## 2024-03-04 NOTE — TELEPHONE ENCOUNTER
LOV:     RTC: 6 months     FU:    Last Refill:     Month Supply [facility] day supply, 2 refills    Last office visit note: - continue LT4 50mcg  - TFTs q6 months    Refill pended and routed for review.

## 2024-03-05 RX ORDER — LEVOTHYROXINE SODIUM 0.05 MG/1
50 TABLET ORAL
Qty: 90 TABLET | Refills: 2 | Status: SHIPPED | OUTPATIENT
Start: 2024-03-05

## 2024-04-16 ENCOUNTER — PATIENT MESSAGE (OUTPATIENT)
Dept: SURGERY | Facility: CLINIC | Age: 44
End: 2024-04-16

## 2024-04-16 DIAGNOSIS — R82.90 URINE FINDING: Primary | ICD-10-CM

## 2024-04-19 ENCOUNTER — LAB ENCOUNTER (OUTPATIENT)
Dept: LAB | Age: 44
End: 2024-04-19
Payer: COMMERCIAL

## 2024-04-19 DIAGNOSIS — R82.90 URINE FINDING: ICD-10-CM

## 2024-04-19 LAB
BILIRUB UR QL STRIP.AUTO: NEGATIVE
CLARITY UR REFRACT.AUTO: CLEAR
COLOR UR AUTO: YELLOW
GLUCOSE UR STRIP.AUTO-MCNC: NORMAL MG/DL
LEUKOCYTE ESTERASE UR QL STRIP.AUTO: 250
NITRITE UR QL STRIP.AUTO: NEGATIVE
PH UR STRIP.AUTO: 6.5 [PH] (ref 5–8)
RBC UR QL AUTO: NEGATIVE
SP GR UR STRIP.AUTO: 1.02 (ref 1–1.03)
UROBILINOGEN UR STRIP.AUTO-MCNC: NORMAL MG/DL

## 2024-04-19 PROCEDURE — 87086 URINE CULTURE/COLONY COUNT: CPT

## 2024-04-19 PROCEDURE — 81001 URINALYSIS AUTO W/SCOPE: CPT

## 2024-04-23 ENCOUNTER — NURSE ONLY (OUTPATIENT)
Dept: SURGERY | Facility: CLINIC | Age: 44
End: 2024-04-23

## 2024-04-23 ENCOUNTER — LAB ENCOUNTER (OUTPATIENT)
Dept: LAB | Age: 44
End: 2024-04-23
Attending: STUDENT IN AN ORGANIZED HEALTH CARE EDUCATION/TRAINING PROGRAM
Payer: COMMERCIAL

## 2024-04-23 DIAGNOSIS — E03.9 HYPOTHYROIDISM, UNSPECIFIED TYPE: ICD-10-CM

## 2024-04-23 DIAGNOSIS — N39.0 RECURRENT UTI: Primary | ICD-10-CM

## 2024-04-23 DIAGNOSIS — E06.3 HASHIMOTO'S THYROIDITIS: ICD-10-CM

## 2024-04-23 LAB
ANION GAP SERPL CALC-SCNC: 6 MMOL/L (ref 0–18)
BUN BLD-MCNC: 11 MG/DL (ref 9–23)
CALCIUM BLD-MCNC: 10.2 MG/DL (ref 8.5–10.1)
CHLORIDE SERPL-SCNC: 106 MMOL/L (ref 98–112)
CO2 SERPL-SCNC: 24 MMOL/L (ref 21–32)
CREAT BLD-MCNC: 0.76 MG/DL
EGFRCR SERPLBLD CKD-EPI 2021: 98 ML/MIN/1.73M2 (ref 60–?)
FASTING STATUS PATIENT QL REPORTED: NO
GLUCOSE BLD-MCNC: 95 MG/DL (ref 70–99)
OSMOLALITY SERPL CALC.SUM OF ELEC: 281 MOSM/KG (ref 275–295)
POTASSIUM SERPL-SCNC: 4 MMOL/L (ref 3.5–5.1)
PTH-INTACT SERPL-MCNC: 49.3 PG/ML (ref 18.5–88)
SODIUM SERPL-SCNC: 136 MMOL/L (ref 136–145)
T3 SERPL-MCNC: 96 NG/DL (ref 60–181)
T4 FREE SERPL-MCNC: 1.2 NG/DL (ref 0.8–1.7)
TSI SER-ACNC: 1.68 MIU/ML (ref 0.36–3.74)
VIT D+METAB SERPL-MCNC: 34.1 NG/ML (ref 30–100)

## 2024-04-23 PROCEDURE — 82306 VITAMIN D 25 HYDROXY: CPT | Performed by: STUDENT IN AN ORGANIZED HEALTH CARE EDUCATION/TRAINING PROGRAM

## 2024-04-23 PROCEDURE — 84443 ASSAY THYROID STIM HORMONE: CPT | Performed by: STUDENT IN AN ORGANIZED HEALTH CARE EDUCATION/TRAINING PROGRAM

## 2024-04-23 PROCEDURE — 80048 BASIC METABOLIC PNL TOTAL CA: CPT | Performed by: STUDENT IN AN ORGANIZED HEALTH CARE EDUCATION/TRAINING PROGRAM

## 2024-04-23 PROCEDURE — 83970 ASSAY OF PARATHORMONE: CPT | Performed by: STUDENT IN AN ORGANIZED HEALTH CARE EDUCATION/TRAINING PROGRAM

## 2024-04-23 PROCEDURE — 84480 ASSAY TRIIODOTHYRONINE (T3): CPT | Performed by: STUDENT IN AN ORGANIZED HEALTH CARE EDUCATION/TRAINING PROGRAM

## 2024-04-23 PROCEDURE — 84439 ASSAY OF FREE THYROXINE: CPT | Performed by: STUDENT IN AN ORGANIZED HEALTH CARE EDUCATION/TRAINING PROGRAM

## 2024-04-24 NOTE — PROGRESS NOTES
Please have her on the cancellation list if any earlier appointments open for either of us. She is Dr. Allen's patient so I have to review her case in detail but unfortunately sometimes the surgeries are not totally successful and there is residual overactivity - I would see if she can follow up with her endocrine surgeon Dr. Garay sooner than us for any further recs on repeat imaging or surgery, otherwise if there is concern for recurrent or residual disease we could discuss started a medication to try to control the parathyroid. She should stay hydrated and drink plenty of water to help flush out any excess calcium.

## 2024-04-26 ENCOUNTER — TELEPHONE (OUTPATIENT)
Dept: SURGERY | Facility: CLINIC | Age: 44
End: 2024-04-26

## 2024-04-29 RX ORDER — LISINOPRIL 5 MG/1
5 TABLET ORAL DAILY
Qty: 90 TABLET | Refills: 3 | Status: SHIPPED | OUTPATIENT
Start: 2024-04-29

## 2024-04-29 NOTE — TELEPHONE ENCOUNTER
Requested Renewals     Name from pharmacy: LISINOPRIL 5MG TABLETS         Will file in chart as: LISINOPRIL 5 MG Oral Tab    Sig: TAKE 1 TABLET(5 MG) BY MOUTH DAILY    Disp: 90 tablet    Refills: 1 (Pharmacy requested: Not specified)    Start: 4/28/2024    Class: Normal    Non-formulary    Last ordered: 6 months ago (10/23/2023) by Mari Palmer MD    Last refill: 1/25/2024    Rx #: [id number]    Hypertension Medications Protocol [id number]/28/2024 03:53 AM   Protocol Details CMP or BMP in past 12 months    Last BP reading less than 140/90    In person appointment or virtual visit in the past 12 mos or appointment in next 3 mos    EGFRCR or GFRNAA > 50      To be filled at: [id number] [address] AT [facility] ([address]), [phone], [phone]        LOV: 10/04/2023  RTC: 3 months  Last Relevant Labs: none  Future Appointments   Date Time Provider Department Center   5/2/2024  1:45 PM Kady Ayon DO [id number] [facility] [place]   5/6/2024  5:00 PM PF CT 1 PF CT [place]   5/24/2024 10:30 AM Mari Palmer MD [facility] 8 [facility] [place]

## 2024-05-02 ENCOUNTER — OFFICE VISIT (OUTPATIENT)
Facility: CLINIC | Age: 44
End: 2024-05-02
Payer: COMMERCIAL

## 2024-05-02 VITALS
BODY MASS INDEX: 25.58 KG/M2 | SYSTOLIC BLOOD PRESSURE: 124 MMHG | WEIGHT: 163 LBS | DIASTOLIC BLOOD PRESSURE: 70 MMHG | HEIGHT: 67 IN | OXYGEN SATURATION: 99 % | HEART RATE: 80 BPM

## 2024-05-02 DIAGNOSIS — E21.3 HYPERPARATHYROIDISM (HCC): ICD-10-CM

## 2024-05-02 DIAGNOSIS — E06.3 HASHIMOTO'S THYROIDITIS: Primary | ICD-10-CM

## 2024-05-02 PROCEDURE — 3078F DIAST BP <80 MM HG: CPT | Performed by: STUDENT IN AN ORGANIZED HEALTH CARE EDUCATION/TRAINING PROGRAM

## 2024-05-02 PROCEDURE — 3008F BODY MASS INDEX DOCD: CPT | Performed by: STUDENT IN AN ORGANIZED HEALTH CARE EDUCATION/TRAINING PROGRAM

## 2024-05-02 PROCEDURE — 3074F SYST BP LT 130 MM HG: CPT | Performed by: STUDENT IN AN ORGANIZED HEALTH CARE EDUCATION/TRAINING PROGRAM

## 2024-05-02 PROCEDURE — 99215 OFFICE O/P EST HI 40 MIN: CPT | Performed by: STUDENT IN AN ORGANIZED HEALTH CARE EDUCATION/TRAINING PROGRAM

## 2024-05-02 NOTE — PATIENT INSTRUCTIONS
Return Visit   [ x ] Physician in 6 months   [  ] In person or video visit  [  ] In person only    [ x ] After visit summary      It was great seeing you today!    Today we discussed primary hyperparathyroidism:  -We reviewed your labs and discussed possible management options  -Please keep me updated once you see Dr. Garay and if you would like to start Sensipar   -We can discuss next steps at that time  -We will plan on repeat thyroid labs in 6 months     Take care!  -Dr. Ayon

## 2024-05-02 NOTE — PROGRESS NOTES
Endocrinology Clinic Telemedicine Note    Name: Bozena Holcomb    Date: 5/2/2024    HISTORY OF PRESENT ILLNESS   Bozena Holcomb is a 44 year old female who presents for  post-parathyroidectomy hyperCa.    Initial HPI in May 2022  HyperCa/hyperPTH hx:  Review of Ca since 2016: 10.4 --> 11.0  Review of PTH since 2021: 88.6 --> 38.8    Parathyroidecomty hx:  Due to hx of kidney stones (pt thinks she's had at least 5), her PCP ordered Ca, then PTH, both elevated.  No FHx of hyperCa.  Never had severe abdominal pain, constipation, AMS.  Saw gen surg Dr. Garay for consultation. Reviewed labs, and pt had endorsed hx of kidney stones, fatigue, concentration issues, and frequent urination.   24hr urine Ca not elevated.  US performed by surgeon showed L posterior gland [id number].  No pre-op CT neck or sestamibi. Plan was to proceed with parathyroidectomy with IOUS  Op note on 3/15/22 showed R superior and L inferior parathyroids showed cellular tissue. L inferior gland had initial .8, peaked at 1078.9, dropped to 38.8 after removal    Pt is now 2 months post op.  2/2022: PTH 54.2, Ca 11.0  Had surgery f/u as well. Was advised to f/u labs again.  Still has sx of fatigue. Is not having frequent urination again.    Interim hx:  March 2023 5/2022 - elevated 24hr urine Ca 412 (2.4L collected), Ca 10.5, iCa 1.46, PTH 91.4, phos 3, vit D 41  5/2022 DXA lumbar -0.5, hip -1.7, fem neck -1.8  2/2023 - Ca 10.2, Cr 0.64, vit D 26.2, TSH 4.07, fT4 0.8, .8  Still feels tired and difficulty focusing and weight gain  Grandmother may have had thyroid disease. Would like to try LT4  Saw Dr Garay this morning for f/u, is going to get imaging and likely get repeat surgery    July 2023 5/2023 - TSH 1.73, fT4 1.2, +Tg ab, +TPO ab -- LT4 50mcg  Pt went back to see Dr Garay   6/27/2023 - underwent parathyroidectomy with IOUS; L superior gland (hypercellular) removed with 85% drop in PTH (348 --> 51.3)  Did not need Ca supplements  afterwards  Tolerating LT4. Has noticed 20# weight gain this year.     Dec 2023  8/2023 - Ca 9.2, PTH 47  12/2023 - Ca 9.7, PTH 36 and 39, fT4 1.2, TSH 1.7, TT3 118 -- LT4 50mcg  Last recurrent kidney stone was in August. Not sure if residual from previous or new onset. She will see urology if it continues     May 2024  4/23/24 Calcium 10.2, PTH 49.3, TSH 1.68, FT4 1.2, vitamin D 34  Vitamin D 1000 every other day  Not on calcium since 2023 surgery   On LT4 50 mcg daily       PAST MEDICAL HISTORY:   Past Medical History:    Anxiety state, unspecified    Calculus of kidney    10/2021    Disorder of thyroid    Endometriosis    Herpes simplex of female genitalia    Kidney stones       PAST SURGICAL HISTORY:   Past Surgical History:   Procedure Laterality Date    Leep      Other      tummy tuck, 2018 and 2019    Other surgical history  03/15/2022    Parathyroidectomy with intraoperative ultrasound, intact PTH assay, intraoperative frozen section, and bilateral neuromonitoring    Other surgical history  2022    lasik       CURRENT MEDICATIONS:    Current Outpatient Medications   Medication Sig Dispense Refill    lisinopril 5 MG Oral Tab TAKE 1 TABLET(5 MG) BY MOUTH DAILY 90 tablet 3    levothyroxine 50 MCG Oral Tab Take 1 tablet (50 mcg total) by mouth before breakfast. 90 tablet 2    ALPRAZolam 0.25 MG Oral Tab TAKE 1 TABLET(0.25 MG) BY MOUTH TWICE DAILY AS NEEDED 30 tablet 3    Norethin-Eth Estrad-Fe Biphas (LO LOESTRIN FE OR) Take by mouth.       Endocrine Medications            levothyroxine 50 MCG Oral Tab            ALLERGIES:  No Known Allergies    SOCIAL HISTORY:    Social History     Socioeconomic History    Marital status: Single   Tobacco Use    Smoking status: Never    Smokeless tobacco: Never   Vaping Use    Vaping status: Never Used   Substance and Sexual Activity    Alcohol use: Yes     Comment: Social     Drug use: No   Other Topics Concern    Caffeine Concern Yes     Comment: coffee 2 cups daily     Exercise No       FAMILY HISTORY:   Family History   Problem Relation Age of Onset    Other (Other) Father         diverticulitis/gout    High Blood Pressure Father     Breast Cancer Maternal Grandmother 60    High Blood Pressure Paternal Grandmother     Colon Cancer Other 50         REVIEW OF SYSTEMS:  Ten point review of systems has been performed and is otherwise negative and/or non-contributory, except as described above.      PHYSICAL EXAM:   There were no vitals filed for this visit.    BMI: There is no height or weight on file to calculate BMI.     CONSTITUTIONAL:  awake, alert, cooperative, no apparent distress, and appears stated age  PSYCH: normal affect    DATA:     Final Diagnosis:   A. Right superior parathyroid, excision:  -Cellular parathyroid gland (0.1 grams).    ovoid fragment of yellow-tan soft tissue measuring 0.6 cm in greatest dimension and weighing 0.1 grams  B. Left inferior parathyroid, excision:  -Cellular parathyroid gland (0.1 grams).  -Adjacent thyroid tissue with chronic inflammation.    ovoid fragment of red-tan soft tissue measuring 0.8 cm in greatest dimension and weighing 0.1 grams.   Electronically signed by Price Car MD on 3/17/2022       ASSESSMENT AND PLAN:    1. Hashimoto's thyroiditis  - TSH and Free T4 [E]; Future  Plan:   Clinically and biochemically euthyroid. ; celiac negativeg  - continue LT4 50mcg  - TFTs q6 months        2. Hyperparathyroidism (HCC)  - PTH, Intact [E]; Future  - Renal Function Panel; Future  Pt s/p right superior and left inferior parathyroid excision in March 2022 for primary hyperPTH, with refractory PTH and Ca. Then s/p L superior parathyroidectomy in June 2023 with 85% decrease in PTH. PTH/Ca remained stable 6 months post-op but now trending up  - We discussed possible management options and patient to see Dr. Garay and then reach out to us regarding her decision (repeat surgery vs. Starting sensipar)  - labs prior to next appointment       RTC 6  months    A total of 40 minutes was spent today on obtaining history, reviewing pertinent labs, evaluating patient, providing multiple treatment options, reinforcing diet/exercise and compliance, and completing documentation.       Kady Ayon DO  Endocrinology, Diabetes & Metabolism   5/2/2024

## 2024-05-16 DIAGNOSIS — F41.9 ANXIETY: ICD-10-CM

## 2024-05-17 NOTE — TELEPHONE ENCOUNTER
ALPRAZOLAM 0.25MG TABLETS          Will file in chart as: ALPRAZOLAM 0.25 MG Oral Tab    Sig: TAKE 1 TABLET(0.25 MG) BY MOUTH TWICE DAILY AS NEEDED    Disp: 30 tablet    Refills: 0 (Pharmacy requested: Not specified)    Start: 5/16/2024    Class: Normal    Non-formulary For: Anxiety    Last ordered: 4 months ago (1/15/2024) by Mari Palmer MD    Last refill: 4/17/2024    Rx #: [id number]    Controlled Substance Medication Bipsti4505/16/2024 02:17 PM    This medication is a controlled substance - forward to provider to refill      LOV 10/4/23  RTC 3 months  Filled 1/15/24 30 tabs 3 refills   Future Appointments   Date Time Provider Department Center   5/24/2024 10:30 AM Mari Palmer MD [facility] 8 [facility] [place]

## 2024-05-20 RX ORDER — ALPRAZOLAM 0.25 MG/1
0.25 TABLET ORAL 2 TIMES DAILY PRN
Qty: 30 TABLET | Refills: 0 | Status: SHIPPED | OUTPATIENT
Start: 2024-05-20

## 2024-05-24 ENCOUNTER — OFFICE VISIT (OUTPATIENT)
Dept: INTERNAL MEDICINE CLINIC | Facility: CLINIC | Age: 44
End: 2024-05-24

## 2024-05-24 VITALS
SYSTOLIC BLOOD PRESSURE: 112 MMHG | TEMPERATURE: 98 F | WEIGHT: 160 LBS | HEART RATE: 78 BPM | DIASTOLIC BLOOD PRESSURE: 82 MMHG | HEIGHT: 67 IN | OXYGEN SATURATION: 98 % | BODY MASS INDEX: 25.11 KG/M2

## 2024-05-24 DIAGNOSIS — Z00.00 LABORATORY EXAM ORDERED AS PART OF ROUTINE GENERAL MEDICAL EXAMINATION: ICD-10-CM

## 2024-05-24 DIAGNOSIS — Z00.00 ENCOUNTER FOR ROUTINE ADULT MEDICAL EXAMINATION: Primary | ICD-10-CM

## 2024-05-24 DIAGNOSIS — J30.2 SEASONAL ALLERGIC RHINITIS, UNSPECIFIED TRIGGER: ICD-10-CM

## 2024-05-24 PROCEDURE — 3079F DIAST BP 80-89 MM HG: CPT | Performed by: INTERNAL MEDICINE

## 2024-05-24 PROCEDURE — 99396 PREV VISIT EST AGE 40-64: CPT | Performed by: INTERNAL MEDICINE

## 2024-05-24 PROCEDURE — 3074F SYST BP LT 130 MM HG: CPT | Performed by: INTERNAL MEDICINE

## 2024-05-24 PROCEDURE — 3008F BODY MASS INDEX DOCD: CPT | Performed by: INTERNAL MEDICINE

## 2024-05-24 RX ORDER — FLUTICASONE PROPIONATE 50 MCG
2 SPRAY, SUSPENSION (ML) NASAL DAILY
Qty: 48 G | Refills: 0 | OUTPATIENT
Start: 2024-05-24

## 2024-05-24 RX ORDER — TIRZEPATIDE 2.5 MG/.5ML
2.5 INJECTION, SOLUTION SUBCUTANEOUS WEEKLY
COMMUNITY
Start: 2024-04-22

## 2024-05-24 RX ORDER — NORETHINDRONE ACETATE AND ETHINYL ESTRADIOL, ETHINYL ESTRADIOL AND FERROUS FUMARATE 1MG-10(24)
1 KIT ORAL DAILY
COMMUNITY
Start: 2024-03-07

## 2024-05-24 RX ORDER — FLUTICASONE PROPIONATE 50 MCG
2 SPRAY, SUSPENSION (ML) NASAL DAILY
Qty: 1 EACH | Refills: 0 | Status: SHIPPED | OUTPATIENT
Start: 2024-05-24 | End: 2024-06-23

## 2024-05-24 NOTE — PROGRESS NOTES
[facility] Internal Medicine Office Note  Chief Complaint:   Chief Complaint   Patient presents with    CPX     Patient is here for routine yearly physical--pt is fasting        HPI:   This is a 44 year old female coming in for physical   HPI    HTN - on lisinopril 5mg   BP has been at goal at home and today     TSH 1.68     Hypercalcemia s/p 2 surgeries   Ca high on recent labs and she has appt with the surgeon next month     Alprazolam she takes as needed; about 10-15 days out of the month. She notes mostly her anxiety comes at night     She had been taking zepbound through [employer]     She has congestion related to seasonal allergies  She is taking zyrtec     Patient Active Problem List   Diagnosis    Anxiety    Hypovitaminosis D     Past Surgical History:   Procedure Laterality Date    Leep      Other      tummy tuck, 2018 and 2019    Other surgical history  03/15/2022    Parathyroidectomy with intraoperative ultrasound, intact PTH assay, intraoperative frozen section, and bilateral neuromonitoring    Other surgical history  2022    lasik     Family History   Problem Relation Age of Onset    Other (Other) Father         diverticulitis/gout    High Blood Pressure Father     Breast Cancer Maternal Grandmother 60    High Blood Pressure Paternal Grandmother     Colon Cancer Other 50        I reviewed her's Past Medical History, Past Surgical History, Family History and   Social History updated shows  Social History     Socioeconomic History    Marital status: Single   Tobacco Use    Smoking status: Never    Smokeless tobacco: Never   Vaping Use    Vaping status: Never Used   Substance and Sexual Activity    Alcohol use: Yes     Comment: Social     Drug use: No   Other Topics Concern    Caffeine Concern Yes     Comment: coffee 2 cups daily    Exercise No     Social Determinants of Health      Received from [facility]     Allergies:  No Known Allergies  Current Outpatient Medications    Medication Sig Dispense Refill    LO LOESTRIN FE 1 MG-10 MCG / 10 MCG Oral Tab Take 1 tablet by mouth daily.      fluticasone propionate 50 MCG/ACT Nasal Suspension 2 sprays by Each Nare route daily. 1 each 0    ALPRAZOLAM 0.25 MG Oral Tab TAKE 1 TABLET(0.25 MG) BY MOUTH TWICE DAILY AS NEEDED 30 tablet 0    lisinopril 5 MG Oral Tab TAKE 1 TABLET(5 MG) BY MOUTH DAILY 90 tablet 3    levothyroxine 50 MCG Oral Tab Take 1 tablet (50 mcg total) by mouth before breakfast. 90 tablet 2    ZEPBOUND 2.5 MG/0.5ML Subcutaneous Solution Auto-injector Inject 2.5 mg as directed once a week. (Patient not taking: Reported on 5/24/2024)      Norethin-Eth Estrad-Fe Biphas (LO LOESTRIN FE OR) Take by mouth. (Patient not taking: Reported on 5/24/2024)           REVIEW OF SYSTEMS:   Review of Systems   Constitutional:  Negative for fever.   HENT:  Negative for congestion.    Eyes:  Negative for visual disturbance.   Respiratory:  Negative for shortness of breath.    Cardiovascular:  Negative for chest pain.   Gastrointestinal:  Negative for constipation.   Genitourinary:  Negative for dysuria.   Neurological: Negative.    Hematological: Negative.    Psychiatric/Behavioral: Negative.          EXAM:   /82 (BP Location: Right arm, Patient Position: Sitting, Cuff Size: adult)   Pulse 78   Temp 97.8 °F (36.6 °C) (Temporal)   Ht 5' 7\" (1.702 m)   Wt 160 lb (72.6 kg)   LMP 03/05/2023 (Approximate)   SpO2 98%   BMI 25.06 kg/m²  Estimated body mass index is 25.06 kg/m² as calculated from the following:    Height as of this encounter: 5' 7\" (1.702 m).    Weight as of this encounter: 160 lb (72.6 kg).   Vital signs reviewed. Appears stated age, well groomed.  Physical Exam  Constitutional:       General: She is not in acute distress.     Appearance: She is well-developed.   HENT:      Head: Normocephalic and atraumatic.      Right Ear: Tympanic membrane normal.      Left Ear: Tympanic membrane normal.   Eyes:      Conjunctiva/sclera:  Conjunctivae normal.   Cardiovascular:      Rate and Rhythm: Normal rate and regular rhythm.      Heart sounds: Normal heart sounds.   Pulmonary:      Effort: Pulmonary effort is normal.      Breath sounds: Normal breath sounds.   Musculoskeletal:      Cervical back: Neck supple.      Right lower leg: No edema.      Left lower leg: No edema.   Skin:     General: Skin is warm and dry.   Neurological:      General: No focal deficit present.      Mental Status: She is alert.   Psychiatric:         Mood and Affect: Mood normal.          ASSESSMENT AND PLAN:   Bozena Holcomb is a 44 year old female with  1. Encounter for routine adult medical examination    2. Laboratory exam ordered as part of routine general medical examination    3. Seasonal allergic rhinitis, unspecified trigger          The plan is as follows  Bozena was seen today for cpx.    Diagnoses and all orders for this visit:    Encounter for routine adult medical examination  -she has an order for mammo from gyne and had to re-schedule it.   -colonoscopy recommended; she plans to get later this year after potential surgery for hyperparathyroidism     Laboratory exam ordered as part of routine general medical examination  -     Lipid Panel; Future  -     CBC With Differential With Platelet; Future    Seasonal allergic rhinitis, unspecified trigger - cont zyrtec. Adding fluticasone for continued symptoms     Other orders  -     fluticasone propionate 50 MCG/ACT Nasal Suspension; 2 sprays by Each Nare route daily.        Orders Placed This Encounter   Procedures    Lipid Panel    CBC With Differential With Platelet       Meds & Refills for this Visit:  Requested Prescriptions     Signed Prescriptions Disp Refills    fluticasone propionate 50 MCG/ACT Nasal Suspension 1 each 0     Si sprays by Each Nare route daily.       Imaging & Consults:  None    Health Maintenance Due   Topic Date Due    Colorectal Cancer Screening  Never done    COVID-19 Vaccine (2 -  2023-24 season) 09/01/2023    Mammogram  11/07/2023    Annual Physical  03/08/2024     Patient/Caregiver Education: Patient/Caregiver Education: There are no barriers to learning. Medical education done. Outcome: Patient verbalizes understanding. Patient is notified to call with any questions, complications, allergies, or worsening or changing symptoms.  Patient is to call with any side effects or complications from the treatments as a result of today.     Mari Palmer MD

## 2024-05-24 NOTE — PATIENT INSTRUCTIONS
Call to schedule mammogram     Call to schedule appointment for colonoscopy through gastroenterology   Dr. Bonilla, Dr. Greene and partners [phone]    Start flonase/fluticasone nasal steroid. 2 sprays each nostril once a day.     Blood work

## 2024-05-27 ENCOUNTER — PATIENT MESSAGE (OUTPATIENT)
Dept: SURGERY | Facility: CLINIC | Age: 44
End: 2024-05-27

## 2024-05-27 DIAGNOSIS — R82.90 URINE FINDING: Primary | ICD-10-CM

## 2024-06-06 ENCOUNTER — HOSPITAL ENCOUNTER (OUTPATIENT)
Dept: CT IMAGING | Age: 44
Discharge: HOME | End: 2024-06-06
Payer: COMMERCIAL

## 2024-06-06 DIAGNOSIS — N39.0 RECURRENT UTI: ICD-10-CM

## 2024-06-06 PROCEDURE — 74176 CT ABD & PELVIS W/O CONTRAST: CPT

## 2024-06-06 NOTE — PROGRESS NOTES
Discussed with pt on phone. Had 2 neg cx from UC since LOV. Put on macrobid each time without much relief. Most recently, had severe low pelvic pain and gross hematuria for one day then all urinary symptoms resolved. This was prior to CT scan. Could have passed a stone.    Recommended observation for now. Discussed stone could be causing UTI but since multiple neg cultures, this is unlikely. She can drop off PCR culture next time after course of abx to see if infxn cleared but likely bladder inflammation at this time.     Could start oab med as well but she would like to hold off on this. If persisting, could maybe do cysto

## 2024-06-14 DIAGNOSIS — F41.9 ANXIETY: ICD-10-CM

## 2024-06-17 RX ORDER — ALPRAZOLAM 0.25 MG/1
0.25 TABLET ORAL 2 TIMES DAILY PRN
Qty: 30 TABLET | Refills: 5 | Status: SHIPPED | OUTPATIENT
Start: 2024-06-17

## 2024-06-17 NOTE — TELEPHONE ENCOUNTER
Protocol failed     Alprazolam 0.25mg     LOV: 5/24/24   RTC: none noted   Filled: 5/20/24 # 30   No future appointments.

## 2024-07-01 ENCOUNTER — PATIENT MESSAGE (OUTPATIENT)
Dept: INTERNAL MEDICINE CLINIC | Facility: CLINIC | Age: 44
End: 2024-07-01

## 2024-07-01 DIAGNOSIS — R25.2 MUSCLE CRAMPS: Primary | ICD-10-CM

## 2024-07-02 NOTE — TELEPHONE ENCOUNTER
From: Bozena Holcomb  To: Mari Palmer  Sent: 7/1/2024 2:26 AM CDT  Subject: Muscle cramps     Hi, I am getting severe leg and feet cramps every night and sometimes during the day. It’s disrupting my sleep and is very painful. I drink a lot of water and do exercise. Massaging them does not work. Is there anything I can do for this? I’m sending the message because you have no appointments until September. Thank you!

## 2024-07-02 NOTE — TELEPHONE ENCOUNTER
Called patient LVM     Last OV: 5/24/24 -   Hypercalcemia (Calcium 10.2) s/p 2 surgeries   Ca high on recent labs and she has appt with the surgeon next month     Patient is now experiencing severe leg cramps at night that are disrupting her sleep.    Calcium lab pended to recheck Calcium level.     Please sign if appropriate. Thank you.

## 2024-07-03 ENCOUNTER — LAB ENCOUNTER (OUTPATIENT)
Dept: LAB | Age: 44
End: 2024-07-03
Attending: INTERNAL MEDICINE
Payer: COMMERCIAL

## 2024-07-03 ENCOUNTER — APPOINTMENT (OUTPATIENT)
Dept: LAB | Age: 44
End: 2024-07-03
Attending: INTERNAL MEDICINE
Payer: COMMERCIAL

## 2024-07-03 DIAGNOSIS — Z00.00 LABORATORY EXAM ORDERED AS PART OF ROUTINE GENERAL MEDICAL EXAMINATION: ICD-10-CM

## 2024-07-03 DIAGNOSIS — R82.90 URINE FINDING: ICD-10-CM

## 2024-07-03 DIAGNOSIS — R25.2 MUSCLE CRAMPS: ICD-10-CM

## 2024-07-03 LAB
ALBUMIN SERPL-MCNC: 4.1 G/DL (ref 3.4–5)
ALBUMIN/GLOB SERPL: 1.2 {RATIO} (ref 1–2)
ALP LIVER SERPL-CCNC: 41 U/L
ALT SERPL-CCNC: 53 U/L
ANION GAP SERPL CALC-SCNC: 8 MMOL/L (ref 0–18)
AST SERPL-CCNC: 24 U/L (ref 15–37)
BASOPHILS # BLD AUTO: 0.02 X10(3) UL (ref 0–0.2)
BASOPHILS NFR BLD AUTO: 0.3 %
BILIRUB SERPL-MCNC: 0.5 MG/DL (ref 0.1–2)
BILIRUB UR QL STRIP.AUTO: NEGATIVE
BUN BLD-MCNC: 8 MG/DL (ref 9–23)
CALCIUM BLD-MCNC: 9.7 MG/DL (ref 8.5–10.1)
CHLORIDE SERPL-SCNC: 104 MMOL/L (ref 98–112)
CHOLEST SERPL-MCNC: 203 MG/DL (ref ?–200)
CLARITY UR REFRACT.AUTO: CLEAR
CO2 SERPL-SCNC: 22 MMOL/L (ref 21–32)
CREAT BLD-MCNC: 1.02 MG/DL
DEPRECATED HBV CORE AB SER IA-ACNC: 115 NG/ML
EGFRCR SERPLBLD CKD-EPI 2021: 69 ML/MIN/1.73M2 (ref 60–?)
EOSINOPHIL # BLD AUTO: 0.09 X10(3) UL (ref 0–0.7)
EOSINOPHIL NFR BLD AUTO: 1.5 %
ERYTHROCYTE [DISTWIDTH] IN BLOOD BY AUTOMATED COUNT: 12.4 %
FASTING PATIENT LIPID ANSWER: YES
FASTING STATUS PATIENT QL REPORTED: YES
GLOBULIN PLAS-MCNC: 3.3 G/DL (ref 2.8–4.4)
GLUCOSE BLD-MCNC: 84 MG/DL (ref 70–99)
GLUCOSE UR STRIP.AUTO-MCNC: NORMAL MG/DL
HCT VFR BLD AUTO: 37.3 %
HDLC SERPL-MCNC: 42 MG/DL (ref 40–59)
HGB BLD-MCNC: 12.7 G/DL
IMM GRANULOCYTES # BLD AUTO: 0.01 X10(3) UL (ref 0–1)
IMM GRANULOCYTES NFR BLD: 0.2 %
IRON SATN MFR SERPL: 29 %
IRON SERPL-MCNC: 100 UG/DL
KETONES UR STRIP.AUTO-MCNC: NEGATIVE MG/DL
LDLC SERPL CALC-MCNC: 144 MG/DL (ref ?–100)
LEUKOCYTE ESTERASE UR QL STRIP.AUTO: NEGATIVE
LYMPHOCYTES # BLD AUTO: 2.43 X10(3) UL (ref 1–4)
LYMPHOCYTES NFR BLD AUTO: 40.9 %
MCH RBC QN AUTO: 32.1 PG (ref 26–34)
MCHC RBC AUTO-ENTMCNC: 34 G/DL (ref 31–37)
MCV RBC AUTO: 94.2 FL
MONOCYTES # BLD AUTO: 0.38 X10(3) UL (ref 0.1–1)
MONOCYTES NFR BLD AUTO: 6.4 %
NEUTROPHILS # BLD AUTO: 3.01 X10 (3) UL (ref 1.5–7.7)
NEUTROPHILS # BLD AUTO: 3.01 X10(3) UL (ref 1.5–7.7)
NEUTROPHILS NFR BLD AUTO: 50.7 %
NITRITE UR QL STRIP.AUTO: NEGATIVE
NONHDLC SERPL-MCNC: 161 MG/DL (ref ?–130)
OSMOLALITY SERPL CALC.SUM OF ELEC: 276 MOSM/KG (ref 275–295)
PH UR STRIP.AUTO: 6.5 [PH] (ref 5–8)
PLATELET # BLD AUTO: 281 10(3)UL (ref 150–450)
POTASSIUM SERPL-SCNC: 3.9 MMOL/L (ref 3.5–5.1)
PROT SERPL-MCNC: 7.4 G/DL (ref 6.4–8.2)
PROT UR STRIP.AUTO-MCNC: NEGATIVE MG/DL
RBC # BLD AUTO: 3.96 X10(6)UL
RBC UR QL AUTO: NEGATIVE
SODIUM SERPL-SCNC: 134 MMOL/L (ref 136–145)
SP GR UR STRIP.AUTO: 1.01 (ref 1–1.03)
TIBC SERPL-MCNC: 343 UG/DL (ref 240–450)
TRANSFERRIN SERPL-MCNC: 230 MG/DL (ref 200–360)
TRIGL SERPL-MCNC: 96 MG/DL (ref 30–149)
UROBILINOGEN UR STRIP.AUTO-MCNC: NORMAL MG/DL
VLDLC SERPL CALC-MCNC: 18 MG/DL (ref 0–30)
WBC # BLD AUTO: 5.9 X10(3) UL (ref 4–11)

## 2024-07-03 PROCEDURE — 80053 COMPREHEN METABOLIC PANEL: CPT | Performed by: INTERNAL MEDICINE

## 2024-07-03 PROCEDURE — 82728 ASSAY OF FERRITIN: CPT | Performed by: INTERNAL MEDICINE

## 2024-07-03 PROCEDURE — 83550 IRON BINDING TEST: CPT | Performed by: INTERNAL MEDICINE

## 2024-07-03 PROCEDURE — 80061 LIPID PANEL: CPT | Performed by: INTERNAL MEDICINE

## 2024-07-03 PROCEDURE — 85025 COMPLETE CBC W/AUTO DIFF WBC: CPT | Performed by: INTERNAL MEDICINE

## 2024-07-03 PROCEDURE — 83540 ASSAY OF IRON: CPT | Performed by: INTERNAL MEDICINE

## 2024-07-16 NOTE — TELEPHONE ENCOUNTER
Is This A New Presentation, Or A Follow-Up?: Skin Lesion
Spoke with Dr. Blair Pelaez in office. Would recommend patient to f/u with her PCP- can possibly get prescription. Hydrate. If pain intolerable, fever, or chills- can go to ER. Phoned patient back and reviewed above- verbalized understanding.
While speaking with patient on telephone re: recent lab results, patient states she feels that she is starting to have symptoms of a kidney stone. States feels similar to when she had kidney stone previously. Her stomach feels \"swollen\". Having pain that \"comes and goes\" \"near belly button\". Would like Doctor Michael Certain input on what to do.
What Type Of Note Output Would You Prefer (Optional)?: Bullet Format
How Severe Is Your Skin Lesion?: moderate
Has Your Skin Lesion Been Treated?: not been treated

## 2024-07-22 RX ORDER — FLUTICASONE PROPIONATE 50 MCG
2 SPRAY, SUSPENSION (ML) NASAL DAILY
Qty: 16 G | Refills: 0 | Status: SHIPPED | OUTPATIENT
Start: 2024-07-22

## 2024-07-22 NOTE — TELEPHONE ENCOUNTER
LOV: 5/24/24   RTC: none noted  Filled: 5/24/24   Future Appointments   Date Time Provider Department Center   7/23/2024  9:00 AM Galina Melara PA-C [id number]

## 2024-07-23 ENCOUNTER — OFFICE VISIT (OUTPATIENT)
Dept: SURGERY | Facility: CLINIC | Age: 44
End: 2024-07-23
Payer: COMMERCIAL

## 2024-07-23 DIAGNOSIS — N39.0 RECURRENT UTI: Primary | ICD-10-CM

## 2024-07-23 LAB
APPEARANCE: CLEAR
BILIRUBIN: NEGATIVE
GLUCOSE (URINE DIPSTICK): NEGATIVE MG/DL
KETONES (URINE DIPSTICK): NEGATIVE MG/DL
LEUKOCYTES: NEGATIVE
MULTISTIX LOT#: ABNORMAL NUMERIC
NITRITE, URINE: NEGATIVE
PH, URINE: 7 (ref 4.5–8)
PROTEIN (URINE DIPSTICK): NEGATIVE MG/DL
SPECIFIC GRAVITY: 1.02 (ref 1–1.03)
URINE-COLOR: YELLOW
UROBILINOGEN,SEMI-QN: 0.2 MG/DL (ref 0–1.9)

## 2024-07-23 PROCEDURE — 81003 URINALYSIS AUTO W/O SCOPE: CPT

## 2024-07-23 PROCEDURE — 51798 US URINE CAPACITY MEASURE: CPT

## 2024-07-23 PROCEDURE — 99214 OFFICE O/P EST MOD 30 MIN: CPT

## 2024-07-23 RX ORDER — SOLIFENACIN SUCCINATE 10 MG/1
10 TABLET, FILM COATED ORAL DAILY
Qty: 90 TABLET | Refills: 3 | Status: SHIPPED | OUTPATIENT
Start: 2024-07-23 | End: 2024-07-29

## 2024-07-23 NOTE — PROGRESS NOTES
HPI:   Patient is a(n) 44 year old female with hx of anxiety, endometriosis, hypothyroidism, and HTN who presents for persistent UTI.     I initially saw pt on 2/20/24 for lingering UTI sx. She had a positive culture with enterococcus 1/2024. This was tx with 14 days of macrobid without much relief. We did a PCR culture which showed 10-49k e. Coli, 10-49k enterococcus, <10k aerococcus. We treated with fosfomycin x3.     It seems sx resolved after this and returned 2 mos later in 4/2024. Culture mixed eamon at the time and tx with 10 days of macrobid. Repeat PCR culture neg.    However, her sx have been persisting. There was two instances of gross hematuria with severe pelvic pain so we got a CT to r/o stones. CT showed bilateral nonobstructing stones <5mm on left and <2mm on right. No hydro or obstructing stones. She has not passed stones before.    No gross hematuria since obtaining CT but her urinary sx persists. She has a constant burning that seems better when bladder is filled and worse with emptying bladder. Azo has stopped helping. Current sx very debilitating to daily life. Has abstained from intercourse since 2/2024 due to discomfort.     Continues to push fluids but not religiously avoiding irritants.     Hx of endometriosis that required surgical intervention by gyne. Endometriosis pain now managed well with OCP.     HISTORY:  Past Medical History:    Anxiety state, unspecified    Calculus of kidney    10/2021    Disorder of thyroid    Endometriosis    Herpes simplex of female genitalia    Kidney stones      Past Surgical History:   Procedure Laterality Date    Leep      Other      tummy tuck, 2018 and 2019    Other surgical history  03/15/2022    Parathyroidectomy with intraoperative ultrasound, intact PTH assay, intraoperative frozen section, and bilateral neuromonitoring    Other surgical history  2022    lasik      Family History   Problem Relation Age of Onset    Other (Other) Father          diverticulitis/gout    High Blood Pressure Father     Breast Cancer Maternal Grandmother 60    High Blood Pressure Paternal Grandmother     Colon Cancer Other 50      Social History:   Social History     Socioeconomic History    Marital status: Single   Tobacco Use    Smoking status: Never    Smokeless tobacco: Never   Vaping Use    Vaping status: Never Used   Substance and Sexual Activity    Alcohol use: Yes     Comment: Social     Drug use: No   Other Topics Concern    Caffeine Concern Yes     Comment: coffee 2 cups daily    Exercise No     Social Determinants of Health      Received from Replaced by [facility]        Medications (Active prior to today's visit):  Current Outpatient Medications   Medication Sig Dispense Refill    FLUTICASONE PROPIONATE 50 MCG/ACT Nasal Suspension SHAKE LIQUID AND USE 2 SPRAYS IN EACH NOSTRIL DAILY 16 g 0    ALPRAZolam 0.25 MG Oral Tab TAKE 1 TABLET(0.25 MG) BY MOUTH TWICE DAILY AS NEEDED 30 tablet 5    ZEPBOUND 2.5 MG/0.5ML Subcutaneous Solution Auto-injector Inject 2.5 mg as directed once a week.      LO LOESTRIN FE 1 MG-10 MCG / 10 MCG Oral Tab Take 1 tablet by mouth daily.      lisinopril 5 MG Oral Tab TAKE 1 TABLET(5 MG) BY MOUTH DAILY 90 tablet 3    levothyroxine 50 MCG Oral Tab Take 1 tablet (50 mcg total) by mouth before breakfast. 90 tablet 2    Norethin-Eth Estrad-Fe Biphas (LO LOESTRIN FE OR) Take by mouth.         Allergies:  No Known Allergies    ROS:     A comprehensive 10 point review of systems was completed.  Pertinent positives and negatives noted in the the HPI.    PHYSICAL EXAM:     GENERAL APPEARANCE: well, developed, well nourished, in no acute distress  EARS: hearing intact  LUNGS: nonlabored breathing  ABDOMEN: soft, no obvious masses or tenderness, no CVA tenderness     ASSESSMENT/PLAN:   Recurrent UTI  - she may have passed stone prior to CT given gross hematuria and pelvic pain but will proceed with cysto given persistent issues and begin gross  hematuria work up  - in the meantime, continue to push fluids and avoid irritants  - begin vesicare 10mg   - avoid constipation    The above assessment and plan were discussed in detail with the patient who verbalized understanding and all questions were answered.    Galina Melara PA-C  Urology  [facility]  Office: [phone]

## 2024-07-26 ENCOUNTER — PATIENT MESSAGE (OUTPATIENT)
Dept: SURGERY | Facility: CLINIC | Age: 44
End: 2024-07-26

## 2024-07-29 RX ORDER — OXYBUTYNIN CHLORIDE 10 MG/1
10 TABLET, EXTENDED RELEASE ORAL DAILY
Qty: 90 TABLET | Refills: 3 | Status: SHIPPED | OUTPATIENT
Start: 2024-07-29

## 2024-07-29 NOTE — TELEPHONE ENCOUNTER
This encounter is now closed.     Vesicare discontinued (d/t dizziness)  Order of Oxybutynin ER 10mg sent.

## 2024-08-09 ENCOUNTER — PROCEDURE (OUTPATIENT)
Dept: SURGERY | Facility: CLINIC | Age: 44
End: 2024-08-09
Payer: COMMERCIAL

## 2024-08-09 DIAGNOSIS — R31.0 GROSS HEMATURIA: ICD-10-CM

## 2024-08-09 DIAGNOSIS — R82.90 URINE FINDING: Primary | ICD-10-CM

## 2024-08-09 LAB
APPEARANCE: CLEAR
BILIRUBIN: NEGATIVE
GLUCOSE (URINE DIPSTICK): NEGATIVE MG/DL
KETONES (URINE DIPSTICK): NEGATIVE MG/DL
MULTISTIX LOT#: ABNORMAL NUMERIC
NITRITE, URINE: NEGATIVE
PH, URINE: 7 (ref 4.5–8)
PROTEIN (URINE DIPSTICK): NEGATIVE MG/DL
SPECIFIC GRAVITY: 1.02 (ref 1–1.03)
URINE-COLOR: YELLOW
UROBILINOGEN,SEMI-QN: 0.2 MG/DL (ref 0–1.9)

## 2024-08-09 PROCEDURE — 99213 OFFICE O/P EST LOW 20 MIN: CPT | Performed by: UROLOGY

## 2024-08-09 PROCEDURE — 81003 URINALYSIS AUTO W/O SCOPE: CPT | Performed by: UROLOGY

## 2024-08-09 PROCEDURE — 52000 CYSTOURETHROSCOPY: CPT | Performed by: UROLOGY

## 2024-08-09 RX ORDER — SULFAMETHOXAZOLE AND TRIMETHOPRIM 800; 160 MG/1; MG/1
1 TABLET ORAL ONCE
Status: COMPLETED | OUTPATIENT
Start: 2024-08-09 | End: 2024-08-09

## 2024-08-09 RX ADMIN — SULFAMETHOXAZOLE AND TRIMETHOPRIM 1 TABLET: 800; 160 TABLET ORAL at 08:57:00

## 2024-08-09 NOTE — PROGRESS NOTES
Clinic Procedure Note    INDICATIONS:      Patient is a(n) 44 year old female with hx of anxiety, endometriosis, hypothyroidism, and HTN who presents for persistent UTI.      Hx of endometriosis that required surgical intervention by gyne. Endometriosis pain now managed well with OCP.   Initially seen 2/20/24 for lingering UTI sx. She had a positive culture with enterococcus 1/2024. This was tx with 14 days of macrobid without much relief. We did a PCR culture which showed 10-49k e. Coli, 10-49k enterococcus, <10k aerococcus. We treated with fosfomycin x3.      It seems sx resolved after this and returned 2 mos later in 4/2024. Culture mixed eamon at the time and tx with 10 days of macrobid. Repeat PCR culture neg.     However, her sx have been persisting. There was two instances of gross hematuria with severe pelvic pain so we got a CT to r/o stones. CT showed bilateral nonobstructing stones <5mm on left and <2mm on right. No hydro or obstructing stones. She has not passed stones before.Did not want surgery for this.      No gross hematuria since obtaining CT but her urinary sx persists. She has a constant burning that seems better when bladder is filled and worse with emptying bladder. Azo has stopped helping. Current sx very debilitating to daily life. Has abstained from intercourse since 2/2024 due to discomfort.   Continues to push fluids but not religiously avoiding irritants.      Here for cystoscopy to eval further.   She was started on oxybutynin at last visit and is doing well with this.     PROCEDURE:       1. Flexible cystourethroscopy    DATE OF PROCEDURE: 8/9/2024     PRE-PROCEDURE DIAGNOSIS: gross hematuria, pelvic pain, UTI     POST-PROCEDURE DIAGNOSIS: Same     SURGEON: Behzad Cruz MD    FINDINGS:    Urethra: Normal caliber urethra throughout without lesions  Bladder: ~1cm stone near the right UO; adhered to mucosa but fell off with irrigation; too large to grasp and remove atraumatically;  therefore left in bladder; otherwise normal mucosa with no papillary lesions or erythema, no trabeculation  Ureteral orifices: left UO orthotopic; effluxing clear; right UO unable to be easily identified as initially covered by stone and then by edema in area of stone; efflux was noted in the vicinity of the right UO but the UO was still not visible   Other findings: None    PROCEDURE:   Patient was brought to the procedure suite and a time-out was performed identifiying the patient,  and procedure to be performed. The risks and benefits of the procedure were once again discussed with the patient including bleeding, infection, and dysuria. The patient agreed to proceed. The patient did not have any signs or symptoms of active UTI.    She was placed in supine position on the table with legs to the sides and the genital area was prepped and draped in the standard sterile fashion. A flexible cystoscope was inserted per urethra. There were no urethral strictures present. The bladder was fully inspected (including retroflexion) and showed findings as above. There was initially a ~1cm stone adhered to the area of the right UO;  this fell off the mucosa but was too large to extract with grasper. I therefore left this in the bladder. The right UO was still not seen due to edema in the area; however I did see efflux in the area.   A bladder barbotage was obtained. The scope was then carefully removed and once again no urethral abnormalities were noted.    There were no complications and the patient tolerated the procedure well.    IMPRESSION AND PLAN:     Pelvic pain  Hematuria   OAB    -Cystoscopy today with large bladder stone about 1 cm in size adhered to the mucosa along the right ureteral orifice.  Given the above history, patient likely passed a kidney stone but this could lodged in the vicinity of the ureteral orifice. This fell off during cystoscopy but I was unable to extract with a grasper due to size.  After  the above procedure, patient was able to urinate out the stone.  She unfortunately flushed the toilet but did obtain a picture of this which was uploaded to media. I reviewed her imaging again, there are some pelvic phleboliths potentially a ureteral stone as well.  Due to these findings and her history of gross hematuria we will obtain a CT urogram to better delineate the distal ureter.   She also had significant edema in the area of the stone/right UO; I recommended a repeat cysto in 2-3 months to ensure no lesions and appropriately healed and she is in agreement.   Fu cytology.       In total, an additional 20 minutes were spent on this patient encounter (including chart review, patient history, physical, and counseling, documentation, and communication).      Behzad Cruz MD  Staff Urologist  [facility]  Office: [phone]

## 2024-08-21 ENCOUNTER — HOSPITAL ENCOUNTER (OUTPATIENT)
Dept: CT IMAGING | Age: 44
Discharge: HOME | End: 2024-08-21
Attending: UROLOGY
Payer: COMMERCIAL

## 2024-08-21 DIAGNOSIS — R31.0 GROSS HEMATURIA: ICD-10-CM

## 2024-08-21 LAB
CREAT BLD-MCNC: 0.9 MG/DL
EGFRCR SERPLBLD CKD-EPI 2021: 80 ML/MIN/1.73M2 (ref 60–?)

## 2024-08-21 PROCEDURE — 82565 ASSAY OF CREATININE: CPT

## 2024-08-21 PROCEDURE — 74178 CT ABD&PLV WO CNTR FLWD CNTR: CPT | Performed by: UROLOGY

## 2024-10-31 ENCOUNTER — TELEPHONE (OUTPATIENT)
Dept: SURGERY | Facility: CLINIC | Age: 44
End: 2024-10-31

## 2024-12-02 DIAGNOSIS — E03.9 HYPOTHYROIDISM, UNSPECIFIED TYPE: ICD-10-CM

## 2024-12-02 RX ORDER — LEVOTHYROXINE SODIUM 50 UG/1
50 TABLET ORAL
Qty: 90 TABLET | Refills: 2 | Status: CANCELLED | OUTPATIENT
Start: 2024-12-02

## 2024-12-02 NOTE — TELEPHONE ENCOUNTER
Please have patient repeat labs that have been placed since 5/2024. Does she need refills now or can she complete labs first? Thanks!

## 2024-12-02 NOTE — TELEPHONE ENCOUNTER
RN phoned patient to no answer left message for call back to discuss.    Mychart message follow up sent as well.

## 2024-12-02 NOTE — TELEPHONE ENCOUNTER
LOV: 5/2/24     Next office visit: RTC 6 MONTHS. No appt scheduled.     Last filled: 3/5/24  Refill request: Levothyroxine      Order pended and routed to provider

## 2024-12-05 ENCOUNTER — LAB ENCOUNTER (OUTPATIENT)
Dept: LAB | Age: 44
End: 2024-12-05
Attending: STUDENT IN AN ORGANIZED HEALTH CARE EDUCATION/TRAINING PROGRAM
Payer: COMMERCIAL

## 2024-12-05 DIAGNOSIS — E06.3 HASHIMOTO'S THYROIDITIS: ICD-10-CM

## 2024-12-05 DIAGNOSIS — E21.3 HYPERPARATHYROIDISM (HCC): ICD-10-CM

## 2024-12-05 LAB
ALBUMIN SERPL-MCNC: 4.4 G/DL (ref 3.2–4.8)
ANION GAP SERPL CALC-SCNC: 11 MMOL/L (ref 0–18)
BUN BLD-MCNC: 18 MG/DL (ref 9–23)
CALCIUM BLD-MCNC: 11.8 MG/DL (ref 8.7–10.4)
CHLORIDE SERPL-SCNC: 104 MMOL/L (ref 98–112)
CO2 SERPL-SCNC: 24 MMOL/L (ref 21–32)
CREAT BLD-MCNC: 0.71 MG/DL
EGFRCR SERPLBLD CKD-EPI 2021: 106 ML/MIN/1.73M2 (ref 60–?)
GLUCOSE BLD-MCNC: 85 MG/DL (ref 70–99)
OSMOLALITY SERPL CALC.SUM OF ELEC: 289 MOSM/KG (ref 275–295)
PHOSPHATE SERPL-MCNC: 3.5 MG/DL (ref 2.4–5.1)
POTASSIUM SERPL-SCNC: 3.9 MMOL/L (ref 3.5–5.1)
PTH-INTACT SERPL-MCNC: 39.2 PG/ML (ref 18.5–88)
SODIUM SERPL-SCNC: 139 MMOL/L (ref 136–145)
T4 FREE SERPL-MCNC: 1.5 NG/DL (ref 0.8–1.7)
TSI SER-ACNC: 1.25 UIU/ML (ref 0.55–4.78)

## 2024-12-05 PROCEDURE — 84439 ASSAY OF FREE THYROXINE: CPT

## 2024-12-05 PROCEDURE — 84443 ASSAY THYROID STIM HORMONE: CPT

## 2024-12-05 PROCEDURE — 83970 ASSAY OF PARATHORMONE: CPT

## 2024-12-05 PROCEDURE — 80069 RENAL FUNCTION PANEL: CPT

## 2024-12-05 PROCEDURE — 36415 COLL VENOUS BLD VENIPUNCTURE: CPT

## 2024-12-06 RX ORDER — LEVOTHYROXINE SODIUM 50 UG/1
50 TABLET ORAL
Qty: 90 TABLET | Refills: 0 | OUTPATIENT
Start: 2024-12-06 | End: 2025-03-06

## 2024-12-06 RX ORDER — LEVOTHYROXINE SODIUM 50 UG/1
50 TABLET ORAL
Qty: 90 TABLET | Refills: 0 | Status: SHIPPED | OUTPATIENT
Start: 2024-12-06 | End: 2025-03-06

## 2024-12-06 NOTE — TELEPHONE ENCOUNTER
Her thyroid function labs are stable on this dose.  If she is noting hair loss, we could try Synthroid or Unithroid at the same dose and she can repeat labs prior to her follow-up with me

## 2024-12-06 NOTE — PROGRESS NOTES
Thyroid labs are stable and in separate TE switched from generic levothyroxine to Unithroid.  In terms of her calcium labs, is it possible to add on an ionized calcium?

## 2024-12-17 DIAGNOSIS — F41.9 ANXIETY: ICD-10-CM

## 2024-12-18 RX ORDER — ALPRAZOLAM 0.25 MG/1
0.25 TABLET ORAL 2 TIMES DAILY PRN
Qty: 30 TABLET | Refills: 5 | Status: SHIPPED | OUTPATIENT
Start: 2024-12-18

## 2024-12-18 NOTE — TELEPHONE ENCOUNTER
ALPRAZOLAM 0.25MG TABLETS          Will file in chart as: ALPRAZOLAM 0.25 MG Oral Tab    Sig: TAKE 1 TABLET(0.25 MG) BY MOUTH TWICE DAILY AS NEEDED    Disp: 30 tablet    Refills: 0 (Pharmacy requested: Not specified)    Start: 12/17/2024    Class: Normal    Non-formulary For: Anxiety    Last ordered: 6 months ago (6/17/2024) by Mari Palmer MD    Last refill: 11/16/2024    Rx #: [id number]    Controlled Substance Medication Untnag6912/17/2024 01:33 PM    This medication is a controlled substance - forward to provider to refill      LOV 5/24/24  RTC none noted  Filled 6/17/24 30 tabs 5 refills   Future Appointments   Date Time Provider Department Center   2/17/2025 11:30 AM Mari Palmer MD [facility] [place]   2/25/2025  9:30 AM Kady Ayon DO [id number] [facility] [place]

## 2024-12-24 ENCOUNTER — TELEPHONE (OUTPATIENT)
Facility: CLINIC | Age: 44
End: 2024-12-24

## 2024-12-24 DIAGNOSIS — E83.52 HYPERCALCEMIA: ICD-10-CM

## 2024-12-24 DIAGNOSIS — E03.9 HYPOTHYROIDISM, UNSPECIFIED TYPE: Primary | ICD-10-CM

## 2024-12-24 NOTE — TELEPHONE ENCOUNTER
Ordered calcium ionized per written order    RN phoned patient to no answer, left message for call back during office hours or to review and response in [facility].     Mychart sent to pt

## 2024-12-27 ENCOUNTER — PATIENT MESSAGE (OUTPATIENT)
Dept: SURGERY | Facility: CLINIC | Age: 44
End: 2024-12-27

## 2024-12-27 DIAGNOSIS — N39.0 RECURRENT UTI: Primary | ICD-10-CM

## 2024-12-30 RX ORDER — LEVOTHYROXINE SODIUM 50 UG/1
TABLET ORAL
Qty: 90 TABLET | Refills: 1 | Status: SHIPPED | OUTPATIENT
Start: 2024-12-30

## 2025-01-09 ENCOUNTER — LAB ENCOUNTER (OUTPATIENT)
Dept: LAB | Age: 45
End: 2025-01-09
Payer: COMMERCIAL

## 2025-01-09 DIAGNOSIS — E83.52 HYPERCALCEMIA: ICD-10-CM

## 2025-01-09 PROCEDURE — 82330 ASSAY OF CALCIUM: CPT

## 2025-01-09 PROCEDURE — 36415 COLL VENOUS BLD VENIPUNCTURE: CPT

## 2025-01-13 LAB — LC CALCIUM, IONIZED: 5.4 MG/DL

## 2025-01-20 NOTE — TELEPHONE ENCOUNTER
PASS  LOV 5/24/24  FU NONE    Medication Quantity Refills Start End   FLUTICASONE PROPIONATE 50 MCG/ACT Nasal Suspension 16 g 0 7/22/2024 --   Sig:   SHAKE LIQUID AND USE 2 SPRAYS IN EACH NOSTRIL DAILY     Route:   Nasal         Future Appointments   Date Time Provider Department Center   2/17/2025 11:30 AM Mari Palmer MD [facility] [place]   2/25/2025  9:30 AM Kady Ayon DO [id number] [facility] [place]

## 2025-01-21 RX ORDER — FLUTICASONE PROPIONATE 50 MCG
2 SPRAY, SUSPENSION (ML) NASAL DAILY
Qty: 16 G | Refills: 3 | Status: SHIPPED | OUTPATIENT
Start: 2025-01-21

## 2025-02-17 ENCOUNTER — TELEMEDICINE (OUTPATIENT)
Dept: INTERNAL MEDICINE CLINIC | Facility: CLINIC | Age: 45
End: 2025-02-17
Payer: COMMERCIAL

## 2025-02-17 DIAGNOSIS — J06.9 VIRAL UPPER RESPIRATORY TRACT INFECTION: ICD-10-CM

## 2025-02-17 DIAGNOSIS — R53.83 FATIGUE, UNSPECIFIED TYPE: Primary | ICD-10-CM

## 2025-02-17 DIAGNOSIS — I10 ESSENTIAL HYPERTENSION: ICD-10-CM

## 2025-02-17 NOTE — PROGRESS NOTES
This is a telemedicine visit with live, interactive video and audio.     Patient understands and accepts financial responsibility for any deductible, co-insurance and/or co-pays associated with this service.    SUBJECTIVE  She has been feeling fatigued  Sleeps about 6 hours/night  Wakes up overnight 1-3 times   Does not wake up to urinate any longer  Sometimes her dog wakes her but usually she just wakes up  +snores    URI symptoms started last night. Her boyfriend was sick a few days earlier. She has cough and congestion which are mild     Blood pressure was at goal when at urgent care recently    HISTORY:  Past Medical History:    Anxiety state, unspecified    Calculus of kidney    10/2021    Disorder of thyroid    Endometriosis    Herpes simplex of female genitalia    Kidney stones      Past Surgical History:   Procedure Laterality Date    Leep      Other      tummy tuck, 2018 and 2019    Other surgical history  03/15/2022    Parathyroidectomy with intraoperative ultrasound, intact PTH assay, intraoperative frozen section, and bilateral neuromonitoring    Other surgical history  2022    lasik      Family History   Problem Relation Age of Onset    Other (Other) Father         diverticulitis/gout    High Blood Pressure Father     Breast Cancer Maternal Grandmother 60    High Blood Pressure Paternal Grandmother     Colon Cancer Other 50      Social History     Socioeconomic History    Marital status: Single   Tobacco Use    Smoking status: Never    Smokeless tobacco: Never   Vaping Use    Vaping status: Never Used   Substance and Sexual Activity    Alcohol use: Yes     Comment: Social     Drug use: No   Other Topics Concern    Caffeine Concern Yes     Comment: coffee 2 cups daily    Exercise No     Social Drivers of Health      Received from [facility]        Allergies[1]   Current Outpatient Medications   Medication Sig Dispense Refill    fluticasone propionate 50 MCG/ACT Nasal Suspension SHAKE  LIQUID AND USE 2 SPRAYS IN EACH NOSTRIL DAILY 16 g 3    UNITHROID 50 MCG Oral Tab Take 1 tablet daily in the morning on an empty stomach. 90 tablet 1    ALPRAZolam 0.25 MG Oral Tab TAKE 1 TABLET(0.25 MG) BY MOUTH TWICE DAILY AS NEEDED 30 tablet 5    ZEPBOUND 2.5 MG/0.5ML Subcutaneous Solution Auto-injector Inject 2.5 mg as directed once a week.      LO LOESTRIN FE 1 MG-10 MCG / 10 MCG Oral Tab Take 1 tablet by mouth daily.      lisinopril 5 MG Oral Tab TAKE 1 TABLET(5 MG) BY MOUTH DAILY 90 tablet 3    levothyroxine 50 MCG Oral Tab Take 1 tablet (50 mcg total) by mouth before breakfast. 90 tablet 2    Norethin-Eth Estrad-Fe Biphas (LO LOESTRIN FE OR) Take by mouth.         OBJECTIVE  Physical Exam:   No acute distress. Alert and oriented. Normal work of breathing    ASSESSMENT & PLAN  Diagnoses and all orders for this visit:    Fatigue, unspecified type -she had recent thyroid and vitamin D blood work.  Will check CBC as part of workup.  Normal hemoglobin in July.  Discussed sleep hygiene and getting out of bed if more than 20 minutes and cannot fall back asleep.  Do a quiet nonstimulating activitiy such as reading, listen to calming music, and get back into bed when feeling drowsy.  We discussed since she snores, we should consider a sleep study in future if increasing the number of hours of sleep to 7 to 8 hours per night is not helpful in relieving fatigue symptoms.  She does not think alprazolam is related because sometimes she takes it once a week for example, but we discussed trying 1/2 tablet to see if this is sufficient in controlling symptoms.  Let me know if symptoms persist  -     CBC With Differential With Platelet; Future    Viral upper respiratory tract infection -mild symptoms and she declines prescription for cough.  Recommend a home COVID test.  Let me know if symptoms worsen or persist    Essential hypertension - check BP at home and let me know if it persists below 110 systolic. Cont lisinopril 5mg        Mari Palmer MD             [1] No Known Allergies

## 2025-03-24 ENCOUNTER — LAB ENCOUNTER (OUTPATIENT)
Dept: LAB | Age: 45
End: 2025-03-24
Attending: INTERNAL MEDICINE
Payer: COMMERCIAL

## 2025-03-24 DIAGNOSIS — R53.83 FATIGUE, UNSPECIFIED TYPE: ICD-10-CM

## 2025-03-24 LAB
BASOPHILS # BLD AUTO: 0.03 X10(3) UL (ref 0–0.2)
BASOPHILS NFR BLD AUTO: 0.6 %
EOSINOPHIL # BLD AUTO: 0.08 X10(3) UL (ref 0–0.7)
EOSINOPHIL NFR BLD AUTO: 1.5 %
ERYTHROCYTE [DISTWIDTH] IN BLOOD BY AUTOMATED COUNT: 12.9 %
HCT VFR BLD AUTO: 40.7 %
HGB BLD-MCNC: 13.1 G/DL
IMM GRANULOCYTES # BLD AUTO: 0.02 X10(3) UL (ref 0–1)
IMM GRANULOCYTES NFR BLD: 0.4 %
LYMPHOCYTES # BLD AUTO: 1.48 X10(3) UL (ref 1–4)
LYMPHOCYTES NFR BLD AUTO: 27.6 %
MCH RBC QN AUTO: 32 PG (ref 26–34)
MCHC RBC AUTO-ENTMCNC: 32.2 G/DL (ref 31–37)
MCV RBC AUTO: 99.3 FL
MONOCYTES # BLD AUTO: 0.37 X10(3) UL (ref 0.1–1)
MONOCYTES NFR BLD AUTO: 6.9 %
NEUTROPHILS # BLD AUTO: 3.38 X10 (3) UL (ref 1.5–7.7)
NEUTROPHILS # BLD AUTO: 3.38 X10(3) UL (ref 1.5–7.7)
NEUTROPHILS NFR BLD AUTO: 63 %
PLATELET # BLD AUTO: 271 10(3)UL (ref 150–450)
RBC # BLD AUTO: 4.1 X10(6)UL
WBC # BLD AUTO: 5.4 X10(3) UL (ref 4–11)

## 2025-03-24 PROCEDURE — 36415 COLL VENOUS BLD VENIPUNCTURE: CPT

## 2025-03-24 PROCEDURE — 85025 COMPLETE CBC W/AUTO DIFF WBC: CPT

## 2025-06-20 DIAGNOSIS — F41.9 ANXIETY: ICD-10-CM

## 2025-06-21 RX ORDER — LISINOPRIL 5 MG/1
5 TABLET ORAL DAILY
Qty: 90 TABLET | Refills: 3 | Status: SHIPPED | OUTPATIENT
Start: 2025-06-21

## 2025-06-21 RX ORDER — ALPRAZOLAM 0.25 MG
0.25 TABLET ORAL 2 TIMES DAILY PRN
Qty: 30 TABLET | Refills: 5 | Status: SHIPPED | OUTPATIENT
Start: 2025-06-21

## 2025-06-21 NOTE — TELEPHONE ENCOUNTER
Protocol passed    Requesting lisinopril 5 MG Oral Tab   LOV: 05/24/24  RTC: 09/05/25  Last Relevant Labs: 07/03/24  Filled: 04/29/24 #90 with 3 refills    Future Appointments   Date Time Provider Department Center   9/5/2025  9:30 AM Mari Palmer MD [facility] 8 [facility] [place]

## 2025-06-21 NOTE — TELEPHONE ENCOUNTER
PROTOCOL FAILED    Requesting ALPRAZolam 0.25 MG Oral Tab   LOV: 05/24/24  RTC: 09/05/25  Last Relevant Labs:   Filled: 12/18/24 #30 with 5 refills    Future Appointments   Date Time Provider Department Center   9/5/2025  9:30 AM Mari Palmer MD [facility] 8 [facility] [place]

## 2025-07-23 DIAGNOSIS — E03.9 HYPOTHYROIDISM, UNSPECIFIED TYPE: ICD-10-CM

## 2025-07-23 NOTE — TELEPHONE ENCOUNTER
Endocrine refill protocol for medications for hypothyroidism and hyperthyroidism    Protocol Criteria:  FAILED Reason: No Visit in required time frame    If all below requirements are met, send a 90-day supply with 1 refill per provider protocol.    Verify appointment with Endocrinology completed in the last 12 months or scheduled in the next 6 months.    Normal TSH result in the past 12 months   Review recent telephone encounters and mychart communications with patient to ensure a dose change has not occurred since last office visit that was not updated in the medication history list     Last completed office visit:Visit date not found 5/2/24  Last completed telemed visit: Visit date not found  Next scheduled Follow up:   Future Appointments   Date Time Provider Department Center   9/5/2025  9:30 AM Mari Palmer MD [facility] [place]      Last TSH result:   TSH   Date Value Ref Range Status   12/05/2024 1.250 0.550 - 4.780 uIU/mL Final       Pended and routed for review.

## 2025-07-24 RX ORDER — LEVOTHYROXINE SODIUM 50 UG/1
50 TABLET ORAL
Qty: 90 TABLET | Refills: 1 | Status: SHIPPED | OUTPATIENT
Start: 2025-07-24

## (undated) DIAGNOSIS — F41.9 ANXIETY: ICD-10-CM

## (undated) DEVICE — SOL  .9 1000ML BTL

## (undated) DEVICE — ALCOHOL PREP,2 PLY, MEDIUM: Brand: WEBCOL

## (undated) DEVICE — SUT VICRYL 3-0 SH J416H

## (undated) DEVICE — THYROID: Brand: MEDLINE INDUSTRIES, INC.

## (undated) DEVICE — SOLUTION  .9 1000ML BTL

## (undated) DEVICE — CURAD PAPER TAPE 2IN

## (undated) DEVICE — SUTURE VICRYL 3-0 SH

## (undated) DEVICE — HEMOCLIP HORIZON MED 002200

## (undated) DEVICE — ABSORBABLE HEMOSTAT (OXIDIZED REGENERATED CELLULOSE, U.S.P.): Brand: SURGICEL

## (undated) DEVICE — STERILE POLYISOPRENE POWDER-FREE SURGICAL GLOVES: Brand: PROTEXIS

## (undated) DEVICE — Device

## (undated) DEVICE — SUTURE SILK 3-0

## (undated) DEVICE — CLOSURE EXOFIN 1.0ML

## (undated) DEVICE — SCD SLEEVE KNEE HI BLEND

## (undated) DEVICE — DISPOSABLE TOURNIQUET CUFF SINGLE BLADDER, DUAL PORT AND QUICK CONNECT CONNECTOR: Brand: COLOR CUFF

## (undated) DEVICE — STERILE SYNTHETIC POLYISOPRENE POWDER-FREE SURGICAL GLOVES WITH HYDROGEL COATING: Brand: PROTEXIS

## (undated) DEVICE — VIOLET BRAIDED (POLYGLACTIN 910), SYNTHETIC ABSORBABLE SUTURE: Brand: COATED VICRYL

## (undated) DEVICE — PROBE 8225101 5PK STD PRASS FL TIP ROHS

## (undated) DEVICE — AIRWAY ENDO  TRIVANTAGE 7MM

## (undated) DEVICE — MINI-BLADE®: Brand: BEAVER®

## (undated) DEVICE — SPONGE: SPECIALTY PEANUT XR 100/CS: Brand: MEDICAL ACTION INDUSTRIES

## (undated) DEVICE — STERILE POLYISOPRENE POWDER-FREE SURGICAL GLOVES WITH EMOLLIENT COATING: Brand: PROTEXIS

## (undated) DEVICE — SUT MONOCRYL 4-0 PS-2 Y496G

## (undated) DEVICE — SLEEVE KENDALL SCD EXPRESS MED

## (undated) DEVICE — SUT SILK 3-0 A304H

## (undated) DEVICE — SOL NACL IRRIG 0.9% 1000ML BTL

## (undated) DEVICE — UPPER EXTREMITY CDS-LF: Brand: MEDLINE INDUSTRIES, INC.

## (undated) DEVICE — OINTMENT CURAD BACITRACIN .3OZ

## (undated) DEVICE — 3M™ STERI-STRIP™ REINFORCED ADHESIVE SKIN CLOSURES, R1547, 1/2 IN X 4 IN (12 MM X 100 MM), 6 STRIPS/ENVELOPE: Brand: 3M™ STERI-STRIP™

## (undated) DEVICE — DISPOSABLE BIPOLAR FORCEPS 4" (10.2CM) JEWELERS, STRAIGHT 0.4MM TIP AND 12 FT. (3.6M) CABLE: Brand: KIRWAN

## (undated) DEVICE — 1000 S-DRAPE TOWEL DRAPE 10/BX: Brand: STERI-DRAPE™

## (undated) DEVICE — SUTURE MONOCRYL 4-0 PS-2

## (undated) DEVICE — 3M(TM) MICROPORE TAPE DISPENSER 1535-2: Brand: 3M™ MICROPORE™

## (undated) DEVICE — PADDING CAST COTTON STER 3

## (undated) DEVICE — GAUZE SPONGES,12 PLY: Brand: CURITY

## (undated) DEVICE — HOOK LOCK LATEX FREE ELASTIC BANDAGE 2INX5YD

## (undated) DEVICE — NON-ADHERENT STRIPS,OIL EMULSION: Brand: CURITY

## (undated) NOTE — Clinical Note
Hi Dr. Juan Diego Hung attached my initial endocrine consult note for our shared pt, please let me know if there is anything else needed, thanks!  -Mahesh Musa

## (undated) NOTE — LETTER
09/17/21        [address] [facility]      Dear Ezio Enciso,    Our records indicate that you have outstanding lab work and or testing that was ordered for you and has not yet been completed:  Orders Placed This Encounter      RON W

## (undated) NOTE — LETTER
01/07/21        [address] [facility]      Dear Mechelle Rodriguez records indicate that you have outstanding lab work and or testing that was ordered for you and has not yet been completed:  Fasting Lab Work   To keep our patients

## (undated) NOTE — LETTER
Patient Name: Penelope Davenport  YOB: 1980          MRN :  [id number]  Date:  10/7/2022  Referring Physician:  No ref. provider found    Progress Summary  Pt has attended 10 visits in Occupational Therapy. Subjective: \"I came in to see what I can do on my own since I don't have insurance right now. \"    Pain: 0/10      Objective: Digit 2-5 ROM is WFL  Wrist flexion R=90, L=60  Wrist extension R=65, L=65    Right D1 MCP=65, IP=70    Left D1 MCP=45, IP=40  Difficulty with positioning for opposition  Pt is now 8 weeks post op    Assessment: Continued improvement of wrist ROM noted. Improvement in ROM of D1 noted following performance of exercises in clinic. HEP upgraded to progress independently as able as pt no longer has insurance coverage and would like to hold off on therapy at this point. Discussed activity progression and limitations at this point. Goals: (to be met in 2 visits)  Pt will demonstrate independence with don/doff splint. Pt will verbalize understanding of splint precautions and instructions for splint care. To be completed in 8 visits  Pt will increase wrist ROM to be [facility] for use while dressing: progressed  Pt will increase D1 IP flexion to be at least 55 deg for use while manipulating small objects in hand: 40 deg  Pt will increase thumb DUMONT to be at least 100 deg for use while handling utensils: 85 deg  Will continue to upgrade as appropriate. Plan: Hold therapy at this point due to insurance coverage. Pt will notify clinic with intention to return. Will d/c if no contact in 1 month. Patient/Family/Caregiver was advised of these findings, precautions, and treatment options and has agreed to actively participate in planning and for this course of care. Thank you for your referral. If you have any questions, please contact me at Dept: [phone].     Sincerely,  Electronically signed by therapist: RASHMI Sánchez/HUMA     [de-identified] certification required: No  Please co-sign or sign and return this letter via fax as soon as possible to [phone]. I certify the need for these services furnished under this plan of treatment and while under my care. X___________________________________________________ Date____________________    Certification From: 8/70/6925  To:12/11/2022 21st Century Cures Act Notice to Patient: Medical documents like this are made available to patients in the interest of transparency. However, be advised this is a medical document and it is intended as kuiv-[id number] communication between your medical providers. This medical document may contain abbreviations, assessments, medical data, and results or other terms that are unfamiliar. Medical documents are intended to carry relevant information, facts as evident, and the clinical opinion of the practitioner. As such, this medical document may be written in language that appears blunt or direct. You are encouraged to contact your medical provider and/or [facility] Patient Experience if you have any questions about this medical document.

## (undated) NOTE — LETTER
09/08/20        [address] [facility]      Dear Cheo Wilson,    Our records indicate that you have outstanding lab work and or testing that was ordered for you and has not yet been completed:  Orders Placed This Encounter      RON W

## (undated) NOTE — MR AVS SNAPSHOT
[facility] [address]  [address] [facility] [place]  [phone]               Thank you for choosing us for your health care visit with Alejandra Archibald MD.  We are glad to serve you and happy to provide you with interactions.  It is your duty and for your safety to discuss with the pharmacist and our office with questions, and to notify us and stop treatment if problems arise, but know that our intention is that the benefits outweigh those potential risks and we s Bring a paper prescription for each of these medications    - alprazolam 0.25 MG Tabs            MyChart     Visit MyChart  You can access your MyChart to more actively manage your health care and view more details from this visit by going to [email or web address]

## (undated) NOTE — LETTER
[address]  [address]  [facility]  [phone]            July 23, 2019      [address] [place] [id number]      Dear Iva Castellanos:    Our office has been trying to

## (undated) NOTE — LETTER
Patient Name: An Ying  YOB: 1980          MRN :  [id number]  Date:  9/12/2022  Referring Physician:  No ref. provider found    Progress Summary  Pt has attended 5 visits in Occupational Therapy. Subjective: \"There's no pain, sometimes it's a little painful when I try to really move it but it's not bad. \"    Pain: 0/10      Objective: Digit 2-5 ROM is WFL  Wrist flexion R=90, L=43  Wrist extension R=65, L=40    Right D1 MCP=65, IP=70    Left D1 MCP=30, IP=30  Difficulty with positioning for opposition      Assessment: Continued slow improvement of wrist and thumb motion. Pain well controlled overall. Scar adhesions present and extrinsic tightness persists. Improvement in IP flexion of 15 deg by conclusion of tx, though continued difficulty with opposition. Pt encouraged to perform AROM exercises many times throughout the day in all planes. Pt will continue to benefit from skilled therapy to maximize functional outcomes. Goals: (to be met in 2 visits)  Pt will demonstrate independence with don/doff splint. Pt will verbalize understanding of splint precautions and instructions for splint care. To be completed in 8 visits  Pt will increase wrist ROM [facility] [place] for use while dressing  Pt will increase D1 IP flexion to be at least 55 deg for use while manipulating small objects in hand  Pt will increase thumb DUMONT to be at least 100 deg for use while handling utensils  Will continue to upgrade as appropriate. Plan: Continue tx for ROM, scar mob. Progress as appropriate. Patient/Family/Caregiver was advised of these findings, precautions, and treatment options and has agreed to actively participate in planning and for this course of care. Thank you for your referral. If you have any questions, please contact me at Dept: [phone].     Sincerely,  Electronically signed by therapist: RASHMI Saucedo/HUMA     Physician's certification required:  No  Please co-sign or sign and return this letter via fax as soon as possible to [phone]. I certify the need for these services furnished under this plan of treatment and while under my care. X___________________________________________________ Date____________________    Certification From: 3/47/3596  To:12/11/2022 21st Century Cures Act Notice to Patient: Medical documents like this are made available to patients in the interest of transparency. However, be advised this is a medical document and it is intended as ckai-cj-pffy communication between your medical providers. This medical document may contain abbreviations, assessments, medical data, and results or other terms that are unfamiliar. Medical documents are intended to carry relevant information, facts as evident, and the clinical opinion of the practitioner. As such, this medical document may be written in language that appears blunt or direct. You are encouraged to contact your medical provider and/or [facility] if you have any questions about this medical document.